# Patient Record
Sex: FEMALE | Race: WHITE | URBAN - METROPOLITAN AREA
[De-identification: names, ages, dates, MRNs, and addresses within clinical notes are randomized per-mention and may not be internally consistent; named-entity substitution may affect disease eponyms.]

---

## 2018-04-02 ENCOUNTER — OUTPATIENT (OUTPATIENT)
Dept: OUTPATIENT SERVICES | Facility: HOSPITAL | Age: 53
LOS: 1 days | Discharge: HOME | End: 2018-04-02

## 2018-04-03 DIAGNOSIS — G47.33 OBSTRUCTIVE SLEEP APNEA (ADULT) (PEDIATRIC): ICD-10-CM

## 2018-05-15 ENCOUNTER — OUTPATIENT (OUTPATIENT)
Dept: OUTPATIENT SERVICES | Facility: HOSPITAL | Age: 53
LOS: 1 days | Discharge: HOME | End: 2018-05-15

## 2018-05-16 DIAGNOSIS — G47.33 OBSTRUCTIVE SLEEP APNEA (ADULT) (PEDIATRIC): ICD-10-CM

## 2018-07-11 ENCOUNTER — OUTPATIENT (OUTPATIENT)
Dept: OUTPATIENT SERVICES | Facility: HOSPITAL | Age: 53
LOS: 1 days | Discharge: HOME | End: 2018-07-11

## 2018-07-11 DIAGNOSIS — Z12.31 ENCOUNTER FOR SCREENING MAMMOGRAM FOR MALIGNANT NEOPLASM OF BREAST: ICD-10-CM

## 2018-08-02 ENCOUNTER — APPOINTMENT (OUTPATIENT)
Dept: OTOLARYNGOLOGY | Facility: CLINIC | Age: 53
End: 2018-08-02
Payer: COMMERCIAL

## 2018-08-02 DIAGNOSIS — J32.9 CHRONIC SINUSITIS, UNSPECIFIED: ICD-10-CM

## 2018-08-02 DIAGNOSIS — M79.7 FIBROMYALGIA: ICD-10-CM

## 2018-08-02 DIAGNOSIS — H81.09 MENIERE'S DISEASE, UNSPECIFIED EAR: ICD-10-CM

## 2018-08-02 DIAGNOSIS — Z80.3 FAMILY HISTORY OF MALIGNANT NEOPLASM OF BREAST: ICD-10-CM

## 2018-08-02 DIAGNOSIS — G47.30 SLEEP APNEA, UNSPECIFIED: ICD-10-CM

## 2018-08-02 PROCEDURE — 31575 DIAGNOSTIC LARYNGOSCOPY: CPT

## 2018-08-02 PROCEDURE — 99204 OFFICE O/P NEW MOD 45 MIN: CPT | Mod: 25

## 2018-08-02 RX ORDER — FEXOFENADINE HYDROCHLORIDE 180 MG/1
180 TABLET, FILM COATED ORAL
Refills: 0 | Status: ACTIVE | COMMUNITY

## 2018-08-02 RX ORDER — DESVENLAFAXINE SUCCINATE 25 MG/1
25 TABLET, EXTENDED RELEASE ORAL
Refills: 0 | Status: ACTIVE | COMMUNITY

## 2018-08-02 RX ORDER — DULOXETINE HYDROCHLORIDE 30 MG/1
30 CAPSULE, DELAYED RELEASE ORAL
Refills: 0 | Status: ACTIVE | COMMUNITY

## 2018-08-02 RX ORDER — CELECOXIB 100 MG/1
100 CAPSULE ORAL
Refills: 0 | Status: ACTIVE | COMMUNITY

## 2018-08-03 ENCOUNTER — TRANSCRIPTION ENCOUNTER (OUTPATIENT)
Age: 53
End: 2018-08-03

## 2018-08-07 ENCOUNTER — OTHER (OUTPATIENT)
Age: 53
End: 2018-08-07

## 2018-09-13 ENCOUNTER — APPOINTMENT (OUTPATIENT)
Dept: OTOLARYNGOLOGY | Facility: CLINIC | Age: 53
End: 2018-09-13
Payer: COMMERCIAL

## 2018-09-13 DIAGNOSIS — Z78.9 OTHER SPECIFIED HEALTH STATUS: ICD-10-CM

## 2018-09-13 PROCEDURE — 99214 OFFICE O/P EST MOD 30 MIN: CPT

## 2018-09-13 RX ORDER — MONTELUKAST 10 MG/1
10 TABLET, FILM COATED ORAL
Qty: 90 | Refills: 3 | Status: ACTIVE | COMMUNITY
Start: 2018-08-02 | End: 1900-01-01

## 2018-11-08 ENCOUNTER — APPOINTMENT (OUTPATIENT)
Dept: OTOLARYNGOLOGY | Facility: CLINIC | Age: 53
End: 2018-11-08

## 2018-11-20 ENCOUNTER — APPOINTMENT (OUTPATIENT)
Dept: OTOLARYNGOLOGY | Facility: CLINIC | Age: 53
End: 2018-11-20
Payer: COMMERCIAL

## 2018-11-20 DIAGNOSIS — J30.2 OTHER SEASONAL ALLERGIC RHINITIS: ICD-10-CM

## 2018-11-20 DIAGNOSIS — Z87.898 PERSONAL HISTORY OF OTHER SPECIFIED CONDITIONS: ICD-10-CM

## 2018-11-20 PROCEDURE — 99214 OFFICE O/P EST MOD 30 MIN: CPT

## 2018-11-20 RX ORDER — FLUTICASONE PROPIONATE 50 UG/1
50 SPRAY, METERED NASAL DAILY
Qty: 1 | Refills: 2 | Status: ACTIVE | COMMUNITY
Start: 2018-11-20 | End: 1900-01-01

## 2019-02-05 ENCOUNTER — OUTPATIENT (OUTPATIENT)
Dept: OUTPATIENT SERVICES | Facility: HOSPITAL | Age: 54
LOS: 1 days | Discharge: HOME | End: 2019-02-05

## 2019-02-05 VITALS
HEIGHT: 66 IN | SYSTOLIC BLOOD PRESSURE: 135 MMHG | HEART RATE: 72 BPM | TEMPERATURE: 97 F | WEIGHT: 162.92 LBS | OXYGEN SATURATION: 99 % | RESPIRATION RATE: 18 BRPM | DIASTOLIC BLOOD PRESSURE: 60 MMHG

## 2019-02-05 DIAGNOSIS — Z01.818 ENCOUNTER FOR OTHER PREPROCEDURAL EXAMINATION: ICD-10-CM

## 2019-02-05 DIAGNOSIS — J34.2 DEVIATED NASAL SEPTUM: ICD-10-CM

## 2019-02-05 DIAGNOSIS — Z98.890 OTHER SPECIFIED POSTPROCEDURAL STATES: Chronic | ICD-10-CM

## 2019-02-05 LAB
ALBUMIN SERPL ELPH-MCNC: 4.6 G/DL — SIGNIFICANT CHANGE UP (ref 3.5–5.2)
ALP SERPL-CCNC: 102 U/L — SIGNIFICANT CHANGE UP (ref 30–115)
ALT FLD-CCNC: 19 U/L — SIGNIFICANT CHANGE UP (ref 0–41)
ANION GAP SERPL CALC-SCNC: 13 MMOL/L — SIGNIFICANT CHANGE UP (ref 7–14)
APTT BLD: 33.6 SEC — SIGNIFICANT CHANGE UP (ref 27–39.2)
AST SERPL-CCNC: 20 U/L — SIGNIFICANT CHANGE UP (ref 0–41)
BASOPHILS # BLD AUTO: 0.03 K/UL — SIGNIFICANT CHANGE UP (ref 0–0.2)
BASOPHILS NFR BLD AUTO: 0.5 % — SIGNIFICANT CHANGE UP (ref 0–1)
BILIRUB SERPL-MCNC: 0.3 MG/DL — SIGNIFICANT CHANGE UP (ref 0.2–1.2)
BUN SERPL-MCNC: 16 MG/DL — SIGNIFICANT CHANGE UP (ref 10–20)
CALCIUM SERPL-MCNC: 9.8 MG/DL — SIGNIFICANT CHANGE UP (ref 8.5–10.1)
CHLORIDE SERPL-SCNC: 102 MMOL/L — SIGNIFICANT CHANGE UP (ref 98–110)
CO2 SERPL-SCNC: 31 MMOL/L — SIGNIFICANT CHANGE UP (ref 17–32)
CREAT SERPL-MCNC: 0.8 MG/DL — SIGNIFICANT CHANGE UP (ref 0.7–1.5)
EOSINOPHIL # BLD AUTO: 0.19 K/UL — SIGNIFICANT CHANGE UP (ref 0–0.7)
EOSINOPHIL NFR BLD AUTO: 3.1 % — SIGNIFICANT CHANGE UP (ref 0–8)
GLUCOSE SERPL-MCNC: 104 MG/DL — HIGH (ref 70–99)
HCT VFR BLD CALC: 37.1 % — SIGNIFICANT CHANGE UP (ref 37–47)
HGB BLD-MCNC: 12.2 G/DL — SIGNIFICANT CHANGE UP (ref 12–16)
IMM GRANULOCYTES NFR BLD AUTO: 0.3 % — SIGNIFICANT CHANGE UP (ref 0.1–0.3)
INR BLD: 0.9 RATIO — SIGNIFICANT CHANGE UP (ref 0.65–1.3)
LYMPHOCYTES # BLD AUTO: 1.77 K/UL — SIGNIFICANT CHANGE UP (ref 1.2–3.4)
LYMPHOCYTES # BLD AUTO: 28.7 % — SIGNIFICANT CHANGE UP (ref 20.5–51.1)
MCHC RBC-ENTMCNC: 29 PG — SIGNIFICANT CHANGE UP (ref 27–31)
MCHC RBC-ENTMCNC: 32.9 G/DL — SIGNIFICANT CHANGE UP (ref 32–37)
MCV RBC AUTO: 88.1 FL — SIGNIFICANT CHANGE UP (ref 81–99)
MONOCYTES # BLD AUTO: 0.39 K/UL — SIGNIFICANT CHANGE UP (ref 0.1–0.6)
MONOCYTES NFR BLD AUTO: 6.3 % — SIGNIFICANT CHANGE UP (ref 1.7–9.3)
NEUTROPHILS # BLD AUTO: 3.76 K/UL — SIGNIFICANT CHANGE UP (ref 1.4–6.5)
NEUTROPHILS NFR BLD AUTO: 61.1 % — SIGNIFICANT CHANGE UP (ref 42.2–75.2)
NRBC # BLD: 0 /100 WBCS — SIGNIFICANT CHANGE UP (ref 0–0)
PLATELET # BLD AUTO: 230 K/UL — SIGNIFICANT CHANGE UP (ref 130–400)
POTASSIUM SERPL-MCNC: 4.2 MMOL/L — SIGNIFICANT CHANGE UP (ref 3.5–5)
POTASSIUM SERPL-SCNC: 4.2 MMOL/L — SIGNIFICANT CHANGE UP (ref 3.5–5)
PROT SERPL-MCNC: 7.3 G/DL — SIGNIFICANT CHANGE UP (ref 6–8)
PROTHROM AB SERPL-ACNC: 10.4 SEC — SIGNIFICANT CHANGE UP (ref 9.95–12.87)
RBC # BLD: 4.21 M/UL — SIGNIFICANT CHANGE UP (ref 4.2–5.4)
RBC # FLD: 13.6 % — SIGNIFICANT CHANGE UP (ref 11.5–14.5)
SODIUM SERPL-SCNC: 146 MMOL/L — SIGNIFICANT CHANGE UP (ref 135–146)
WBC # BLD: 6.16 K/UL — SIGNIFICANT CHANGE UP (ref 4.8–10.8)
WBC # FLD AUTO: 6.16 K/UL — SIGNIFICANT CHANGE UP (ref 4.8–10.8)

## 2019-02-05 NOTE — H&P PST ADULT - PMH
Asthma    Depression    Fibromyalgia    Migraine without status migrainosus, not intractable, unspecified migraine type

## 2019-02-05 NOTE — H&P PST ADULT - REASON FOR ADMISSION
WHERE IS YOUR PAIN NOW?  Jessica the areas on your body where you feel the described sensations.  Use the appropriate symbol.  Jessica the areas of radiation.  Include all affected areas.  Just to complete the picture, please draw in the face.     ACHE:  ^ ^ ^   NUMBNESS:  0000   PINS & NEEDLES:  = = = =                              ^ ^ ^                       0000              = = = =                                    ^ ^ ^                       0000            = = = =      BURNING:  XXXX   STABBING: ////                  XXXX                ////                         XXXX          ////     Please jessica the line below indicating your degree of pain right now  with 0 being no pain 10 being the worst pain possible.                                         0             1             2              3             4              5              6              7             8             9             10         Patient Signature:            
septoplasty bilateral inferior turbinate reduction possible excision nasal tip mass

## 2019-02-15 ENCOUNTER — OUTPATIENT (OUTPATIENT)
Dept: OUTPATIENT SERVICES | Facility: HOSPITAL | Age: 54
LOS: 1 days | Discharge: HOME | End: 2019-02-15

## 2019-02-15 ENCOUNTER — APPOINTMENT (OUTPATIENT)
Dept: OTOLARYNGOLOGY | Facility: HOSPITAL | Age: 54
End: 2019-02-15
Payer: COMMERCIAL

## 2019-02-15 ENCOUNTER — RESULT REVIEW (OUTPATIENT)
Age: 54
End: 2019-02-15

## 2019-02-15 VITALS
HEART RATE: 62 BPM | RESPIRATION RATE: 20 BRPM | WEIGHT: 162.92 LBS | SYSTOLIC BLOOD PRESSURE: 121 MMHG | HEIGHT: 66 IN | DIASTOLIC BLOOD PRESSURE: 66 MMHG

## 2019-02-15 VITALS — DIASTOLIC BLOOD PRESSURE: 71 MMHG | RESPIRATION RATE: 18 BRPM | SYSTOLIC BLOOD PRESSURE: 140 MMHG | HEART RATE: 65 BPM

## 2019-02-15 DIAGNOSIS — Z98.890 OTHER SPECIFIED POSTPROCEDURAL STATES: Chronic | ICD-10-CM

## 2019-02-15 PROCEDURE — 30140 RESECT INFERIOR TURBINATE: CPT | Mod: 50

## 2019-02-15 PROCEDURE — 30520 REPAIR OF NASAL SEPTUM: CPT

## 2019-02-15 RX ORDER — HYDROMORPHONE HYDROCHLORIDE 2 MG/ML
0.5 INJECTION INTRAMUSCULAR; INTRAVENOUS; SUBCUTANEOUS
Qty: 0 | Refills: 0 | Status: DISCONTINUED | OUTPATIENT
Start: 2019-02-15 | End: 2019-02-16

## 2019-02-15 RX ORDER — MEPERIDINE HYDROCHLORIDE 50 MG/ML
12.5 INJECTION INTRAMUSCULAR; INTRAVENOUS; SUBCUTANEOUS
Qty: 0 | Refills: 0 | Status: DISCONTINUED | OUTPATIENT
Start: 2019-02-15 | End: 2019-02-16

## 2019-02-15 RX ORDER — SODIUM CHLORIDE 9 MG/ML
1000 INJECTION, SOLUTION INTRAVENOUS
Qty: 0 | Refills: 0 | Status: DISCONTINUED | OUTPATIENT
Start: 2019-02-15 | End: 2019-02-16

## 2019-02-15 RX ORDER — HYDROMORPHONE HYDROCHLORIDE 2 MG/ML
1 INJECTION INTRAMUSCULAR; INTRAVENOUS; SUBCUTANEOUS
Qty: 0 | Refills: 0 | Status: DISCONTINUED | OUTPATIENT
Start: 2019-02-15 | End: 2019-02-16

## 2019-02-15 RX ORDER — ONDANSETRON 8 MG/1
4 TABLET, FILM COATED ORAL ONCE
Qty: 0 | Refills: 0 | Status: DISCONTINUED | OUTPATIENT
Start: 2019-02-15 | End: 2019-02-16

## 2019-02-15 RX ADMIN — HYDROMORPHONE HYDROCHLORIDE 0.5 MILLIGRAM(S): 2 INJECTION INTRAMUSCULAR; INTRAVENOUS; SUBCUTANEOUS at 11:59

## 2019-02-15 RX ADMIN — HYDROMORPHONE HYDROCHLORIDE 0.5 MILLIGRAM(S): 2 INJECTION INTRAMUSCULAR; INTRAVENOUS; SUBCUTANEOUS at 12:35

## 2019-02-15 RX ADMIN — HYDROMORPHONE HYDROCHLORIDE 0.5 MILLIGRAM(S): 2 INJECTION INTRAMUSCULAR; INTRAVENOUS; SUBCUTANEOUS at 12:43

## 2019-02-15 RX ADMIN — SODIUM CHLORIDE 125 MILLILITER(S): 9 INJECTION, SOLUTION INTRAVENOUS at 11:47

## 2019-02-15 RX ADMIN — HYDROMORPHONE HYDROCHLORIDE 0.5 MILLIGRAM(S): 2 INJECTION INTRAMUSCULAR; INTRAVENOUS; SUBCUTANEOUS at 12:44

## 2019-02-15 NOTE — ASU DISCHARGE PLAN (ADULT/PEDIATRIC). - PAIN
Can take ibuprofen and/or tylenol for pain. Do not exceed 3500mg tylenol/day/prescription called to pharmacy

## 2019-02-15 NOTE — CHART NOTE - NSCHARTNOTEFT_GEN_A_CORE
PACU ANESTHESIA PACU ADMISSION NOTE      Procedure:Septoplasty with reduction of turbinates on both sides of nose    Post op diagnosisNasal septal deviation      ____ Intubated  TV:______       Rate: ______      FiO2: ______    ___x_ Patent Airway    ___x_ Full return of protective reflexes    ____ Full recovery from anesthesia / sedation to baseline status    Viitals:  see anesthesia record          Mental Status:  ___x_ Awake   _____ Alert   _____ Drowsy   _____ Sedated    Nausea/Vomiting: ____ Yes, See Post - Op Orders      __x__ No    Pain Scale (0-10): _____    Treatment: ____ None    __x__ See Post - Op/PCA Orders    Post - Operative Fluids:   ____ Oral   __x__ See Post - Op Orders    Plan:         Discharge:   __x__Home       _____Floor         _____Critical Care    _____Other:_________________    Comments:  uneventful perioperative course; VSS on admission to PACU; endorsed to RN

## 2019-02-15 NOTE — BRIEF OPERATIVE NOTE - PROCEDURE
<<-----Click on this checkbox to enter Procedure Septoplasty with reduction of turbinates on both sides of nose  02/15/2019    Active  [id number]

## 2019-02-15 NOTE — PRE-ANESTHESIA EVALUATION ADULT - NSANTHADDINFOFT_GEN_ALL_CORE
Discussed risks and benefits of anesthesia including but not limited to the risk of sore throat, N/V, damage to mouth, teeth and lips, stroke, MI and even death.  Patient demonstrates understanding, all questions answered. The patient wishes to proceed with planned treatment.

## 2019-02-18 LAB — SURGICAL PATHOLOGY STUDY: SIGNIFICANT CHANGE UP

## 2019-02-19 PROBLEM — F32.9 MAJOR DEPRESSIVE DISORDER, SINGLE EPISODE, UNSPECIFIED: Chronic | Status: ACTIVE | Noted: 2019-02-05

## 2019-02-19 PROBLEM — J45.909 UNSPECIFIED ASTHMA, UNCOMPLICATED: Chronic | Status: ACTIVE | Noted: 2019-02-05

## 2019-02-19 PROBLEM — G43.909 MIGRAINE, UNSPECIFIED, NOT INTRACTABLE, WITHOUT STATUS MIGRAINOSUS: Chronic | Status: ACTIVE | Noted: 2019-02-05

## 2019-02-19 PROBLEM — M79.7 FIBROMYALGIA: Chronic | Status: ACTIVE | Noted: 2019-02-05

## 2019-02-20 ENCOUNTER — APPOINTMENT (OUTPATIENT)
Dept: OTOLARYNGOLOGY | Facility: CLINIC | Age: 54
End: 2019-02-20
Payer: COMMERCIAL

## 2019-02-20 VITALS — SYSTOLIC BLOOD PRESSURE: 122 MMHG | HEIGHT: 67 IN | DIASTOLIC BLOOD PRESSURE: 77 MMHG

## 2019-02-20 DIAGNOSIS — J34.3 HYPERTROPHY OF NASAL TURBINATES: ICD-10-CM

## 2019-02-20 DIAGNOSIS — J34.2 DEVIATED NASAL SEPTUM: ICD-10-CM

## 2019-02-20 DIAGNOSIS — Z88.2 ALLERGY STATUS TO SULFONAMIDES: ICD-10-CM

## 2019-02-20 PROCEDURE — 99024 POSTOP FOLLOW-UP VISIT: CPT

## 2019-02-20 RX ORDER — TINIDAZOLE 500 MG/1
500 TABLET, FILM COATED ORAL
Qty: 20 | Refills: 0 | Status: ACTIVE | COMMUNITY
Start: 2018-10-17

## 2019-02-20 RX ORDER — METRONIDAZOLE 500 MG/1
500 TABLET ORAL
Qty: 14 | Refills: 0 | Status: ACTIVE | COMMUNITY
Start: 2018-10-04

## 2019-02-20 RX ORDER — CIPROFLOXACIN HYDROCHLORIDE 500 MG/1
500 TABLET, FILM COATED ORAL
Qty: 14 | Refills: 0 | Status: ACTIVE | COMMUNITY
Start: 2018-11-17

## 2019-02-20 RX ORDER — OXYCODONE AND ACETAMINOPHEN 5; 325 MG/1; MG/1
5-325 TABLET ORAL
Qty: 40 | Refills: 0 | Status: ACTIVE | COMMUNITY
Start: 2019-02-16

## 2019-02-20 RX ORDER — SUMATRIPTAN 100 MG/1
100 TABLET, FILM COATED ORAL
Qty: 9 | Refills: 0 | Status: ACTIVE | COMMUNITY
Start: 2018-11-30

## 2019-02-20 RX ORDER — DULOXETINE HYDROCHLORIDE 60 MG/1
60 CAPSULE, DELAYED RELEASE PELLETS ORAL
Qty: 90 | Refills: 0 | Status: ACTIVE | COMMUNITY
Start: 2018-11-30

## 2019-02-20 NOTE — PHYSICAL EXAM
[Normal] : external appearance is normal [de-identified] : nasal septal stents removed. Nasal cavity suctioned of clot and secretions. Patent bilateral nasal cavities - visualized with 0 degree nasal endoscope

## 2019-02-20 NOTE — ASSESSMENT
[FreeTextEntry1] : - healing well, subjective improvement in nasal breathing after nasal stent removal\par - continue nasal saline spray\par - f/up in 1 month

## 2019-02-20 NOTE — HISTORY OF PRESENT ILLNESS
[de-identified] : 53 Year old patient is present today with chronic sinus problems for ~20 years. She had severe seasonal allergies and asthma. Sinus pressure and problems started in 1994. Facial pain and pressure over forehead and cheekbones x 8 years. She has used Flonase in the past, with some benefit at night. She denies current rhinorrhea however in the winter she has post-nasal drainage. She has periods of time where her symptoms are moderate, notably in the summer, other times of the year, symptoms more severe in remaining parts of the year. She reports nasal congestion on the left side worse than right. Nasal congestion is mild. She was allergy tested, 21 of 40 skin tests were positive.  She did not start allergy testing. She was taking Singulair and Allegra, no more refills on Singulair so only on Allegra. She uses CPAP for AZAEL, AHI 7 in March 2018.\par \par Patient has nasal congestion mostly during the night. Patient does take allegra during the day, and patient states it seem to help her during the day. She does have a deviated septum. Dr Montana talked to her about balloon procedures.\par \par  9/13/18: Patient following up on sinusitis. Patient had CT performed. Brings CD of sinuses and allergy test results from 1 year ago. Has not made f/up appointment with allergist. Did not have Singulair refilled yet due to pharmacy issues. \par \par 11/20/18 patient is following up for  seasonal allergic rhinitis. She started taking Singulair and has some improvement. She still c/o of nasal dryness, worse at night. Is using humidifier. Is using saline gel, without much benefit.  She has seen an allergist and has begun allergy testing, has another visit set up.  Tinnitus in right ear for 2-3 days. Unsure if hearing loss. She has a UTI, started on cipro last week. Is not currently using her CPAP.  [FreeTextEntry1] : \par 2/20/19 Patient is s/p septoplasty 2/10/19. Overall doing well. Using saline spray.

## 2019-02-22 ENCOUNTER — APPOINTMENT (OUTPATIENT)
Dept: OTOLARYNGOLOGY | Facility: CLINIC | Age: 54
End: 2019-02-22

## 2019-03-05 ENCOUNTER — TRANSCRIPTION ENCOUNTER (OUTPATIENT)
Age: 54
End: 2019-03-05

## 2019-03-27 ENCOUNTER — APPOINTMENT (OUTPATIENT)
Dept: OTOLARYNGOLOGY | Facility: CLINIC | Age: 54
End: 2019-03-27

## 2019-05-17 ENCOUNTER — EMERGENCY (EMERGENCY)
Facility: HOSPITAL | Age: 54
LOS: 0 days | Discharge: HOME | End: 2019-05-17
Attending: EMERGENCY MEDICINE | Admitting: EMERGENCY MEDICINE
Payer: COMMERCIAL

## 2019-05-17 VITALS
SYSTOLIC BLOOD PRESSURE: 148 MMHG | DIASTOLIC BLOOD PRESSURE: 72 MMHG | RESPIRATION RATE: 16 BRPM | HEART RATE: 62 BPM | OXYGEN SATURATION: 100 % | TEMPERATURE: 96 F

## 2019-05-17 VITALS
OXYGEN SATURATION: 100 % | HEART RATE: 63 BPM | RESPIRATION RATE: 18 BRPM | TEMPERATURE: 97 F | WEIGHT: 171.08 LBS | SYSTOLIC BLOOD PRESSURE: 160 MMHG | DIASTOLIC BLOOD PRESSURE: 80 MMHG

## 2019-05-17 DIAGNOSIS — R10.9 UNSPECIFIED ABDOMINAL PAIN: ICD-10-CM

## 2019-05-17 DIAGNOSIS — R10.31 RIGHT LOWER QUADRANT PAIN: ICD-10-CM

## 2019-05-17 DIAGNOSIS — Z79.899 OTHER LONG TERM (CURRENT) DRUG THERAPY: ICD-10-CM

## 2019-05-17 DIAGNOSIS — Z88.2 ALLERGY STATUS TO SULFONAMIDES: ICD-10-CM

## 2019-05-17 DIAGNOSIS — Z98.890 OTHER SPECIFIED POSTPROCEDURAL STATES: Chronic | ICD-10-CM

## 2019-05-17 DIAGNOSIS — R19.7 DIARRHEA, UNSPECIFIED: ICD-10-CM

## 2019-05-17 DIAGNOSIS — E86.0 DEHYDRATION: ICD-10-CM

## 2019-05-17 DIAGNOSIS — Z86.69 PERSONAL HISTORY OF OTHER DISEASES OF THE NERVOUS SYSTEM AND SENSE ORGANS: ICD-10-CM

## 2019-05-17 DIAGNOSIS — F32.9 MAJOR DEPRESSIVE DISORDER, SINGLE EPISODE, UNSPECIFIED: ICD-10-CM

## 2019-05-17 LAB
ALBUMIN SERPL ELPH-MCNC: 4 G/DL — SIGNIFICANT CHANGE UP (ref 3.5–5.2)
ALP SERPL-CCNC: 132 U/L — HIGH (ref 30–115)
ALT FLD-CCNC: 12 U/L — SIGNIFICANT CHANGE UP (ref 0–41)
ANION GAP SERPL CALC-SCNC: 10 MMOL/L — SIGNIFICANT CHANGE UP (ref 7–14)
APPEARANCE UR: CLEAR — SIGNIFICANT CHANGE UP
AST SERPL-CCNC: 14 U/L — SIGNIFICANT CHANGE UP (ref 0–41)
BACTERIA # UR AUTO: ABNORMAL
BASOPHILS # BLD AUTO: 0.08 K/UL — SIGNIFICANT CHANGE UP (ref 0–0.2)
BASOPHILS NFR BLD AUTO: 0.7 % — SIGNIFICANT CHANGE UP (ref 0–1)
BILIRUB SERPL-MCNC: 0.2 MG/DL — SIGNIFICANT CHANGE UP (ref 0.2–1.2)
BILIRUB UR-MCNC: NEGATIVE — SIGNIFICANT CHANGE UP
BUN SERPL-MCNC: 43 MG/DL — HIGH (ref 10–20)
CALCIUM SERPL-MCNC: 9.3 MG/DL — SIGNIFICANT CHANGE UP (ref 8.5–10.1)
CHLORIDE SERPL-SCNC: 99 MMOL/L — SIGNIFICANT CHANGE UP (ref 98–110)
CO2 SERPL-SCNC: 27 MMOL/L — SIGNIFICANT CHANGE UP (ref 17–32)
COLOR SPEC: YELLOW — SIGNIFICANT CHANGE UP
CREAT SERPL-MCNC: 1.8 MG/DL — HIGH (ref 0.7–1.5)
DIFF PNL FLD: NEGATIVE — SIGNIFICANT CHANGE UP
EOSINOPHIL # BLD AUTO: 0.04 K/UL — SIGNIFICANT CHANGE UP (ref 0–0.7)
EOSINOPHIL NFR BLD AUTO: 0.3 % — SIGNIFICANT CHANGE UP (ref 0–8)
EPI CELLS # UR: ABNORMAL /HPF
GLUCOSE SERPL-MCNC: 152 MG/DL — HIGH (ref 70–99)
GLUCOSE UR QL: NEGATIVE MG/DL — SIGNIFICANT CHANGE UP
HCT VFR BLD CALC: 39.1 % — SIGNIFICANT CHANGE UP (ref 37–47)
HGB BLD-MCNC: 12.7 G/DL — SIGNIFICANT CHANGE UP (ref 12–16)
IMM GRANULOCYTES NFR BLD AUTO: 0.3 % — SIGNIFICANT CHANGE UP (ref 0.1–0.3)
KETONES UR-MCNC: NEGATIVE — SIGNIFICANT CHANGE UP
LEUKOCYTE ESTERASE UR-ACNC: ABNORMAL
LIDOCAIN IGE QN: 27 U/L — SIGNIFICANT CHANGE UP (ref 7–60)
LYMPHOCYTES # BLD AUTO: 1.24 K/UL — SIGNIFICANT CHANGE UP (ref 1.2–3.4)
LYMPHOCYTES # BLD AUTO: 10.5 % — LOW (ref 20.5–51.1)
MCHC RBC-ENTMCNC: 30 PG — SIGNIFICANT CHANGE UP (ref 27–31)
MCHC RBC-ENTMCNC: 32.5 G/DL — SIGNIFICANT CHANGE UP (ref 32–37)
MCV RBC AUTO: 92.2 FL — SIGNIFICANT CHANGE UP (ref 81–99)
MONOCYTES # BLD AUTO: 0.45 K/UL — SIGNIFICANT CHANGE UP (ref 0.1–0.6)
MONOCYTES NFR BLD AUTO: 3.8 % — SIGNIFICANT CHANGE UP (ref 1.7–9.3)
NEUTROPHILS # BLD AUTO: 9.97 K/UL — HIGH (ref 1.4–6.5)
NEUTROPHILS NFR BLD AUTO: 84.4 % — HIGH (ref 42.2–75.2)
NITRITE UR-MCNC: NEGATIVE — SIGNIFICANT CHANGE UP
NRBC # BLD: 0 /100 WBCS — SIGNIFICANT CHANGE UP (ref 0–0)
PH UR: 6 — SIGNIFICANT CHANGE UP (ref 5–8)
PLATELET # BLD AUTO: 307 K/UL — SIGNIFICANT CHANGE UP (ref 130–400)
POTASSIUM SERPL-MCNC: 4.7 MMOL/L — SIGNIFICANT CHANGE UP (ref 3.5–5)
POTASSIUM SERPL-SCNC: 4.7 MMOL/L — SIGNIFICANT CHANGE UP (ref 3.5–5)
PROT SERPL-MCNC: 7.2 G/DL — SIGNIFICANT CHANGE UP (ref 6–8)
PROT UR-MCNC: NEGATIVE MG/DL — SIGNIFICANT CHANGE UP
RBC # BLD: 4.24 M/UL — SIGNIFICANT CHANGE UP (ref 4.2–5.4)
RBC # FLD: 13.9 % — SIGNIFICANT CHANGE UP (ref 11.5–14.5)
RBC CASTS # UR COMP ASSIST: SIGNIFICANT CHANGE UP /HPF
SODIUM SERPL-SCNC: 136 MMOL/L — SIGNIFICANT CHANGE UP (ref 135–146)
SP GR SPEC: <=1.005 — SIGNIFICANT CHANGE UP (ref 1.01–1.03)
UROBILINOGEN FLD QL: 0.2 MG/DL — SIGNIFICANT CHANGE UP (ref 0.2–0.2)
WBC # BLD: 11.82 K/UL — HIGH (ref 4.8–10.8)
WBC # FLD AUTO: 11.82 K/UL — HIGH (ref 4.8–10.8)
WBC UR QL: SIGNIFICANT CHANGE UP /HPF

## 2019-05-17 PROCEDURE — 74177 CT ABD & PELVIS W/CONTRAST: CPT | Mod: 26

## 2019-05-17 PROCEDURE — 99284 EMERGENCY DEPT VISIT MOD MDM: CPT

## 2019-05-17 RX ORDER — SODIUM CHLORIDE 9 MG/ML
1000 INJECTION INTRAMUSCULAR; INTRAVENOUS; SUBCUTANEOUS ONCE
Refills: 0 | Status: COMPLETED | OUTPATIENT
Start: 2019-05-17 | End: 2019-05-17

## 2019-05-17 RX ORDER — SODIUM CHLORIDE 9 MG/ML
1000 INJECTION INTRAMUSCULAR; INTRAVENOUS; SUBCUTANEOUS
Refills: 0 | Status: DISCONTINUED | OUTPATIENT
Start: 2019-05-17 | End: 2019-05-17

## 2019-05-17 RX ADMIN — SODIUM CHLORIDE 125 MILLILITER(S): 9 INJECTION INTRAMUSCULAR; INTRAVENOUS; SUBCUTANEOUS at 19:49

## 2019-05-17 RX ADMIN — SODIUM CHLORIDE 2000 MILLILITER(S): 9 INJECTION INTRAMUSCULAR; INTRAVENOUS; SUBCUTANEOUS at 19:34

## 2019-05-17 NOTE — ED PROVIDER NOTE - PROVIDER TOKENS
FREE:[LAST:[your PMD 2 days],PHONE:[(   )    -],FAX:[(   )    -]],PROVIDER:[TOKEN:[[id number]],FOLLOWUP:[1-3 Days]]

## 2019-05-17 NOTE — ED PROVIDER NOTE - CARE PROVIDERS DIRECT ADDRESSES
,DirectAddress_Unknown,[email or web address]

## 2019-05-17 NOTE — ED PROVIDER NOTE - CLINICAL SUMMARY MEDICAL DECISION MAKING FREE TEXT BOX
Results reviewed and d/w patient, copies given to her.  Rec cont Oral hydration, f/u with PMD and GI. Pt instructed to return if any worsening symptoms or concerns.  They verbalize understanding.

## 2019-05-17 NOTE — ED ADULT NURSE NOTE - PSH
H/O knee surgery  3/2019  H/O sinus surgery  2/15/2019  History of surgery  tubal ligation  uterine ablation  pelvic sling

## 2019-05-17 NOTE — ED PROVIDER NOTE - NSFOLLOWUPINSTRUCTIONS_ED_ALL_ED_FT
Abdominal Pain    Many things can cause abdominal pain. Many times, abdominal pain is not caused by a disease and will improve without treatment. Your health care provider will do a physical exam to determine if there is a dangerous cause of your pain; blood tests and imaging may help determine the cause of your pain. However, in many cases, no cause may be found and you may need further testing as an outpatient. Monitor your abdominal pain for any changes.     SEEK IMMEDIATE MEDICAL CARE IF YOU HAVE ANY OF THE FOLLOWING SYMPTOMS: worsening abdominal pain, uncontrollable vomiting, profuse diarrhea, inability to have bowel movements or pass gas, black or bloody stools, fever accompanying chest pain or back pain, or fainting. These symptoms may represent a serious problem that is an emergency. Do not wait to see if the symptoms will go away. Get medical help right away. Call 911 and do not drive yourself to the hospital.    Diarrhea    Diarrhea is frequent loose and watery bowel movements that has many causes. Diarrhea can make you feel weak and cause you to become dehydrated. Diarrhea typically lasts 3-5 days. However, it can last longer if it is a sign of something more serious. Drink clear fluids to prevent dehydration. Eat bland, easy-to-digest foods as tolerated.     SEEK IMMEDIATE MEDICAL CARE IF YOU HAVE THE FOLLOWING SYMPTOMS: fever, lightheadedness/dizziness, chest pain, black or bloody stools, shortness of breath, severe abdominal or back pain, or any signs of dehydration.    Dehydration, Adult    Dehydration is a condition in which there is not enough fluid or water in the body. This happens when you lose more fluids than you take in. Important organs, such as the kidneys, brain, and heart, cannot function without a proper amount of fluids. Any loss of fluids from the body can lead to dehydration.    Dehydration can range from mild to severe. This condition should be treated right away to prevent it from becoming severe.    What are the causes?  This condition may be caused by:    Vomiting.  Diarrhea.  Excessive sweating, such as from heat exposure or exercise.  Not drinking enough fluid, especially:    When ill.  While doing activity that requires a lot of energy.    Excessive urination.  Fever.  Infection.  Certain medicines, such as medicines that cause the body to lose excess fluid (diuretics).  Inability to access safe drinking water.  Reduced physical ability to get adequate water and food.    What increases the risk?  This condition is more likely to develop in people:    Who have a poorly controlled long-term (chronic) illness, such as diabetes, heart disease, or kidney disease.  Who are age 65 or older.  Who are disabled.  Who live in a place with high altitude.  Who play endurance sports.    What are the signs or symptoms?  Symptoms of mild dehydration may include:     Thirst.  Dry lips.  Slightly dry mouth.  Dry, warm skin.  Dizziness.  Symptoms of moderate dehydration may include:     Very dry mouth.  Muscle cramps.  Dark urine. Urine may be the color of tea.  Decreased urine production.  Decreased tear production.  Heartbeat that is irregular or faster than normal (palpitations).  Headache.  Light-headedness, especially when you stand up from a sitting position.  Fainting (syncope).  Symptoms of severe dehydration may include:     Changes in skin, such as:    Cold and clammy skin.  Blotchy (mottled) or pale skin.  Skin that does not quickly return to normal after being lightly pinched and released (poor skin turgor).    Changes in body fluids, such as:    Extreme thirst.  No tear production.  Inability to sweat when body temperature is high, such as in hot weather.  Very little urine production.    Changes in vital signs, such as:    Weak pulse.  Pulse that is more than 100 beats a minute when sitting still.  Rapid breathing.  Low blood pressure.    Other changes, such as:    Sunken eyes.  Cold hands and feet.  Confusion.  Lack of energy (lethargy).  Difficulty waking up from sleep.  Short-term weight loss.  Unconsciousness.    How is this diagnosed?  This condition is diagnosed based on your symptoms and a physical exam. Blood and urine tests may be done to help confirm the diagnosis.    How is this treated?  Treatment for this condition depends on the severity. Mild or moderate dehydration can often be treated at home. Treatment should be started right away. Do not wait until dehydration becomes severe. Severe dehydration is an emergency and it needs to be treated in a hospital.    Treatment for mild dehydration may include:     Drinking more fluids.  Replacing salts and minerals in your blood (electrolytes) that you may have lost.  Treatment for moderate dehydration may include:     Drinking an oral rehydration solution (ORS). This is a drink that helps you replace fluids and electrolytes (rehydrate). It can be found at pharmacies and retail stores.  Treatment for severe dehydration may include:     Receiving fluids through an IV tube.  Receiving an electrolyte solution through a feeding tube that is passed through your nose and into your stomach (nasogastric tube, or NG tube).  Correcting any abnormalities in electrolytes.  Treating the underlying cause of dehydration.  Follow these instructions at home:  If directed by your health care provider, drink an ORS:    Make an ORS by following instructions on the package.  Start by drinking small amounts, about ½ cup (120 mL) every 5–10 minutes.  Slowly increase how much you drink until you have taken the amount recommended by your health care provider.    Drink enough clear fluid to keep your urine clear or pale yellow. If you were told to drink an ORS, finish the ORS first, then start slowly drinking other clear fluids. Drink fluids such as:    Water. Do not drink only water. Doing that can lead to having too little salt (sodium) in the body (hyponatremia).  Ice chips.  Fruit juice that you have added water to (diluted fruit juice).  Low-calorie sports drinks.    Avoid:    Alcohol.  Drinks that contain a lot of sugar. These include high-calorie sports drinks, fruit juice that is not diluted, and soda.  Caffeine.  Foods that are greasy or contain a lot of fat or sugar.    Take over-the-counter and prescription medicines only as told by your health care provider.  Do not take sodium tablets. This can lead to having too much sodium in the body (hypernatremia).  Eat foods that contain a healthy balance of electrolytes, such as bananas, oranges, potatoes, tomatoes, and spinach.  Keep all follow-up visits as told by your health care provider. This is important.  Contact a health care provider if:  You have abdominal pain that:    Gets worse.  Stays in one area (localizes).    You have a rash.  You have a stiff neck.  You are more irritable than usual.  You are sleepier or more difficult to wake up than usual.  You feel weak or dizzy.  You feel very thirsty.  You have urinated only a small amount of very dark urine over 6–8 hours.  Get help right away if:  You have symptoms of severe dehydration.  You cannot drink fluids without vomiting.  Your symptoms get worse with treatment.  You have a fever.  You have a severe headache.  You have vomiting or diarrhea that:    Gets worse.  Does not go away.    You have blood or green matter (bile) in your vomit.  You have blood in your stool. This may cause stool to look black and tarry.  You have not urinated in 6–8 hours.  You faint.  Your heart rate while sitting still is over 100 beats a minute.  You have trouble breathing.  This information is not intended to replace advice given to you by your health care provider. Make sure you discuss any questions you have with your health care provider.    Document Released: 12/18/2006 Document Revised: 07/14/2017 Document Reviewed: 02/10/2017  [employer] Interactive Patient Education © 2019 [employer]
General

## 2019-05-17 NOTE — ED PROVIDER NOTE - CARE PLAN
Principal Discharge DX:	Abdominal pain  Secondary Diagnosis:	Diarrhea  Secondary Diagnosis:	Dehydration

## 2019-05-17 NOTE — ED ADULT NURSE NOTE - NSIMPLEMENTINTERV_GEN_ALL_ED
Implemented All Universal Safety Interventions:  [place] to call system. Call bell, personal items and telephone within reach. Instruct patient to call for assistance. Room bathroom lighting operational. Non-slip footwear when patient is off stretcher. Physically safe environment: no spills, clutter or unnecessary equipment. Stretcher in lowest position, wheels locked, appropriate side rails in place.

## 2019-05-17 NOTE — ED PROVIDER NOTE - CARE PROVIDER_API CALL
your PMD 2 days,   Phone: (   )    -  Fax: (   )    -  Follow Up Time:     Richard Rizvi)  Gastroenterology  [facility] [place] [address]  Phone: [phone]  Fax: [phone]  Follow Up Time: 1-3 Days

## 2019-05-17 NOTE — ED PROVIDER NOTE - NS ED ROS FT
Review of Systems    Constitutional: (-) fever, (-) chills  Eyes/ENT: (-) blurry vision, (-) epistaxis, (-) sore throat  Cardiovascular: (-) chest pain, (-) syncope  Respiratory: (-) cough, (-) shortness of breath  Gastrointestinal: (+) pain, (-) nausea, (-) vomiting, (+) diarrhea  Musculoskeletal: (-) neck pain, (-) back pain, (-) joint pain  Integumentary: (-) rash, (-) edema  Neurological: (-) headache, (-) altered mental status  Psychiatric: (-) hallucinations  Allergic/Immunologic: (-) pruritus

## 2019-05-17 NOTE — ED PROVIDER NOTE - OBJECTIVE STATEMENT
55 yo F c/o diarrhea x3 weeks and abdominal pains today. Pain is intermittent, dull, worse with touching abdomen. No f/c/n/v. No hematuria or dysuria. Sent in by PMD to r/o AP.

## 2019-05-17 NOTE — ED PROVIDER NOTE - PHYSICAL EXAMINATION
Gen: Alert, NAD, well appearing  Head: NC, AT, PERRL, EOMI, normal lids/conjunctiva  ENT: normal hearing, patent oropharynx without erythema/exudate  Neck: +supple, no tenderness/meningismus,  Pulm: Bilateral BS, normal resp effort, no wheeze/stridor/retractions  CV: RRR, no murmer  Abd: soft, tender to RLQ on palpation. BS +. No guarding or rebound. No CVA tenderness. No psoa/obturator  Mskel: no edema/erythema/cyanosis  Skin: no rash, warm/dry  Neuro: AAOx3, no sensory/motor deficits

## 2019-05-17 NOTE — ED PROVIDER NOTE - ATTENDING CONTRIBUTION TO CARE
53 yo F PMHx noted presents with c/o diarrhea x 3 weeks, now with abd pain.  Pt states that initially she did have fever, no n/v, no urinary complaints.  Pt seen by PMD office today and sent in for further evaluation.  Had colonoscopy 5 yrs ago, no recent travel, or abx use. On exam pt in NAD AAO  x3, Op clear, Lungs CAT B/L no wrr, abd soft nd, + BS, + tender lower abdomen, no rebound no guarding

## 2019-07-22 ENCOUNTER — OUTPATIENT (OUTPATIENT)
Dept: OUTPATIENT SERVICES | Facility: HOSPITAL | Age: 54
LOS: 1 days | Discharge: HOME | End: 2019-07-22
Payer: COMMERCIAL

## 2019-07-22 DIAGNOSIS — Z98.890 OTHER SPECIFIED POSTPROCEDURAL STATES: Chronic | ICD-10-CM

## 2019-07-22 DIAGNOSIS — Z12.31 ENCOUNTER FOR SCREENING MAMMOGRAM FOR MALIGNANT NEOPLASM OF BREAST: ICD-10-CM

## 2019-07-22 PROCEDURE — 77063 BREAST TOMOSYNTHESIS BI: CPT | Mod: 26

## 2019-07-22 PROCEDURE — 77067 SCR MAMMO BI INCL CAD: CPT | Mod: 26

## 2020-03-11 ENCOUNTER — OUTPATIENT (OUTPATIENT)
Dept: OUTPATIENT SERVICES | Facility: HOSPITAL | Age: 55
LOS: 1 days | Discharge: HOME | End: 2020-03-11
Payer: COMMERCIAL

## 2020-03-11 VITALS
SYSTOLIC BLOOD PRESSURE: 137 MMHG | RESPIRATION RATE: 16 BRPM | TEMPERATURE: 98 F | DIASTOLIC BLOOD PRESSURE: 82 MMHG | WEIGHT: 158.95 LBS | HEIGHT: 66 IN | HEART RATE: 71 BPM | OXYGEN SATURATION: 99 %

## 2020-03-11 DIAGNOSIS — Z98.890 OTHER SPECIFIED POSTPROCEDURAL STATES: Chronic | ICD-10-CM

## 2020-03-11 DIAGNOSIS — Z01.818 ENCOUNTER FOR OTHER PREPROCEDURAL EXAMINATION: ICD-10-CM

## 2020-03-11 DIAGNOSIS — K80.20 CALCULUS OF GALLBLADDER WITHOUT CHOLECYSTITIS WITHOUT OBSTRUCTION: ICD-10-CM

## 2020-03-11 LAB
ALBUMIN SERPL ELPH-MCNC: 4.7 G/DL — SIGNIFICANT CHANGE UP (ref 3.5–5.2)
ALP SERPL-CCNC: 93 U/L — SIGNIFICANT CHANGE UP (ref 30–115)
ALT FLD-CCNC: 37 U/L — SIGNIFICANT CHANGE UP (ref 0–41)
ANION GAP SERPL CALC-SCNC: 10 MMOL/L — SIGNIFICANT CHANGE UP (ref 7–14)
APTT BLD: 34.3 SEC — SIGNIFICANT CHANGE UP (ref 27–39.2)
AST SERPL-CCNC: 33 U/L — SIGNIFICANT CHANGE UP (ref 0–41)
BASOPHILS # BLD AUTO: 0.03 K/UL — SIGNIFICANT CHANGE UP (ref 0–0.2)
BASOPHILS NFR BLD AUTO: 0.7 % — SIGNIFICANT CHANGE UP (ref 0–1)
BILIRUB SERPL-MCNC: 0.3 MG/DL — SIGNIFICANT CHANGE UP (ref 0.2–1.2)
BUN SERPL-MCNC: 21 MG/DL — HIGH (ref 10–20)
CALCIUM SERPL-MCNC: 9.5 MG/DL — SIGNIFICANT CHANGE UP (ref 8.5–10.1)
CHLORIDE SERPL-SCNC: 100 MMOL/L — SIGNIFICANT CHANGE UP (ref 98–110)
CO2 SERPL-SCNC: 30 MMOL/L — SIGNIFICANT CHANGE UP (ref 17–32)
CREAT SERPL-MCNC: 0.9 MG/DL — SIGNIFICANT CHANGE UP (ref 0.7–1.5)
EOSINOPHIL # BLD AUTO: 0.15 K/UL — SIGNIFICANT CHANGE UP (ref 0–0.7)
EOSINOPHIL NFR BLD AUTO: 3.6 % — SIGNIFICANT CHANGE UP (ref 0–8)
GLUCOSE SERPL-MCNC: 93 MG/DL — SIGNIFICANT CHANGE UP (ref 70–99)
HCT VFR BLD CALC: 37.4 % — SIGNIFICANT CHANGE UP (ref 37–47)
HGB BLD-MCNC: 12.1 G/DL — SIGNIFICANT CHANGE UP (ref 12–16)
IMM GRANULOCYTES NFR BLD AUTO: 0.2 % — SIGNIFICANT CHANGE UP (ref 0.1–0.3)
INR BLD: 0.92 RATIO — SIGNIFICANT CHANGE UP (ref 0.65–1.3)
LYMPHOCYTES # BLD AUTO: 1.6 K/UL — SIGNIFICANT CHANGE UP (ref 1.2–3.4)
LYMPHOCYTES # BLD AUTO: 37.9 % — SIGNIFICANT CHANGE UP (ref 20.5–51.1)
MCHC RBC-ENTMCNC: 28.9 PG — SIGNIFICANT CHANGE UP (ref 27–31)
MCHC RBC-ENTMCNC: 32.4 G/DL — SIGNIFICANT CHANGE UP (ref 32–37)
MCV RBC AUTO: 89.3 FL — SIGNIFICANT CHANGE UP (ref 81–99)
MONOCYTES # BLD AUTO: 0.33 K/UL — SIGNIFICANT CHANGE UP (ref 0.1–0.6)
MONOCYTES NFR BLD AUTO: 7.8 % — SIGNIFICANT CHANGE UP (ref 1.7–9.3)
NEUTROPHILS # BLD AUTO: 2.1 K/UL — SIGNIFICANT CHANGE UP (ref 1.4–6.5)
NEUTROPHILS NFR BLD AUTO: 49.8 % — SIGNIFICANT CHANGE UP (ref 42.2–75.2)
NRBC # BLD: 0 /100 WBCS — SIGNIFICANT CHANGE UP (ref 0–0)
PLATELET # BLD AUTO: 233 K/UL — SIGNIFICANT CHANGE UP (ref 130–400)
POTASSIUM SERPL-MCNC: 4.6 MMOL/L — SIGNIFICANT CHANGE UP (ref 3.5–5)
POTASSIUM SERPL-SCNC: 4.6 MMOL/L — SIGNIFICANT CHANGE UP (ref 3.5–5)
PROT SERPL-MCNC: 7.3 G/DL — SIGNIFICANT CHANGE UP (ref 6–8)
PROTHROM AB SERPL-ACNC: 10.6 SEC — SIGNIFICANT CHANGE UP (ref 9.95–12.87)
RBC # BLD: 4.19 M/UL — LOW (ref 4.2–5.4)
RBC # FLD: 13.4 % — SIGNIFICANT CHANGE UP (ref 11.5–14.5)
SODIUM SERPL-SCNC: 140 MMOL/L — SIGNIFICANT CHANGE UP (ref 135–146)
WBC # BLD: 4.22 K/UL — LOW (ref 4.8–10.8)
WBC # FLD AUTO: 4.22 K/UL — LOW (ref 4.8–10.8)

## 2020-03-11 PROCEDURE — 93010 ELECTROCARDIOGRAM REPORT: CPT

## 2020-03-11 RX ORDER — GABAPENTIN 400 MG/1
1 CAPSULE ORAL
Qty: 0 | Refills: 0 | DISCHARGE

## 2020-03-11 RX ORDER — SUMATRIPTAN SUCCINATE 4 MG/.5ML
0 INJECTION, SOLUTION SUBCUTANEOUS
Qty: 0 | Refills: 0 | DISCHARGE

## 2020-03-11 RX ORDER — DULOXETINE HYDROCHLORIDE 30 MG/1
1 CAPSULE, DELAYED RELEASE ORAL
Qty: 0 | Refills: 0 | DISCHARGE

## 2020-03-11 NOTE — H&P PST ADULT - NSICDXPASTMEDICALHX_GEN_ALL_CORE_FT
PAST MEDICAL HISTORY:  Anxiety     Asthma     Depression     Fibromyalgia     H/O gastroesophageal reflux (GERD)     Migraine without status migrainosus, not intractable, unspecified migraine type

## 2020-03-11 NOTE — H&P PST ADULT - NSICDXFAMILYHX_GEN_ALL_CORE_FT
FAMILY HISTORY:  FH: lung cancer, father, cousin  FH: stomach cancer, grandfather  FHx: breast cancer, mother

## 2020-03-11 NOTE — H&P PST ADULT - NSICDXPASTSURGICALHX_GEN_ALL_CORE_FT
PAST SURGICAL HISTORY:  H/O knee surgery 3/2019    H/O sinus surgery 2/15/2019    History of surgery tubal ligation  uterine ablation  pelvic sling

## 2020-03-11 NOTE — H&P PST ADULT - HISTORY OF PRESENT ILLNESS
53 Y/O FEMALE PRESENTS TO PAST WITH HX CHOLELITHIASIS   PT NOW FOR SCHEDULED PROCEDURE ( LAP JOAQUIN) . PT DENIES ANY CP SOB PALP COUGH DYSURIA FEVER URI. PT ABLE TO BERTA 1-2 FOS W/O SOB

## 2020-03-12 ENCOUNTER — APPOINTMENT (OUTPATIENT)
Dept: SURGERY | Facility: CLINIC | Age: 55
End: 2020-03-12
Payer: COMMERCIAL

## 2020-03-12 VITALS
DIASTOLIC BLOOD PRESSURE: 70 MMHG | WEIGHT: 159 LBS | HEIGHT: 67 IN | BODY MASS INDEX: 24.96 KG/M2 | TEMPERATURE: 98.2 F | SYSTOLIC BLOOD PRESSURE: 105 MMHG | HEART RATE: 85 BPM

## 2020-03-12 DIAGNOSIS — K82.4 CHOLESTEROLOSIS OF GALLBLADDER: ICD-10-CM

## 2020-03-12 PROCEDURE — 99202 OFFICE O/P NEW SF 15 MIN: CPT

## 2020-03-13 PROBLEM — K82.4 GALLBLADDER POLYP: Status: ACTIVE | Noted: 2020-03-13

## 2020-03-13 NOTE — ASSESSMENT
[FreeTextEntry1] : Gallbladder polyps as described, which I do not believe are the cause of her symptoms. She does not have stones or biliary dyskinesia but she is tender over the region of the gallbladder. Her symptoms are most likely due to her peptic disease and the patient understands that a cholecystectomy may not relieve her symptoms. She is concerned about the polyps and we did discuss the small likelihood of malignant transformation, she has the option of continued observation with a repeat ultrasound in 3-6 months to check for enlargement. She wishes to have a prompt cholecystectomy and the laparoscopic procedure was discussed in full with its risks and benefits, including the possibility of open conversion as may be deemed necessary at that time. All her questions were answered and she understands and agrees.  The procedure will be scheduled within the next few weeks, and they are happy with the assessment and plan.

## 2020-03-13 NOTE — PHYSICAL EXAM
[Normal Thyroid] : the thyroid was normal [Normal Breath Sounds] : Normal breath sounds [Normal Heart Sounds] : normal heart sounds [Normal Rate and Rhythm] : normal rate and rhythm [Abdominal Masses] : No abdominal masses [No HSM] : no hepatosplenomegaly [de-identified] : healthy and anicteric [de-identified] : no adenopathy [de-identified] : Soft and not distended. The gallbladder not palpable. She has mild tenderness in the lateral aspect of the right upper quadrant, but the Thomas sign is negative. There is no other abdominal or CVA tenderness. No hernias are noted.

## 2020-03-13 NOTE — REASON FOR VISIT
[Initial Evaluation] : an initial evaluation [Friend] : friend [FreeTextEntry1] : gallbladder polyps

## 2020-03-13 NOTE — HISTORY OF PRESENT ILLNESS
[de-identified] : The patient comes with her male  to be evaluated for a cholecystectomy. She was originally scheduled to have this done with Dr. Moyer.  She has a recent history of crampy right upper quadrant pain after meals along with explosive diarrhea. She has bloating and belching but is no better on a fat-free diet. She had a weight loss of about 25 pounds last year while on a diet but her weight is now stable, and she has no history of hepatitis, pancreatitis, or jaundice. An EGD done last month showed gastritis, duodenitis, esophagitis, and she does feel somewhat better that she is now on a PPI.  She has never had any abdominal surgery.

## 2020-03-13 NOTE — DATA REVIEWED
[FreeTextEntry1] : Abdominal sonogram from last week showed 2 gallbladder polyps, the largest at 9 mm, and no gallstones, acute changes, or biliary dilation.\par HIDA scan with CCK done earlier today was normal with a GB ejection fraction of 45%.

## 2020-04-23 ENCOUNTER — TRANSCRIPTION ENCOUNTER (OUTPATIENT)
Age: 55
End: 2020-04-23

## 2020-04-23 PROBLEM — Z87.19 PERSONAL HISTORY OF OTHER DISEASES OF THE DIGESTIVE SYSTEM: Chronic | Status: ACTIVE | Noted: 2020-03-11

## 2020-04-23 PROBLEM — F41.9 ANXIETY DISORDER, UNSPECIFIED: Chronic | Status: ACTIVE | Noted: 2020-03-11

## 2020-04-28 ENCOUNTER — APPOINTMENT (OUTPATIENT)
Dept: OTOLARYNGOLOGY | Facility: CLINIC | Age: 55
End: 2020-04-28
Payer: COMMERCIAL

## 2020-04-28 PROCEDURE — 99441: CPT

## 2020-05-01 ENCOUNTER — APPOINTMENT (OUTPATIENT)
Dept: OTOLARYNGOLOGY | Facility: CLINIC | Age: 55
End: 2020-05-01
Payer: COMMERCIAL

## 2020-05-01 DIAGNOSIS — M26.622 ARTHRALGIA OF LEFT TEMPOROMANDIBULAR JOINT: ICD-10-CM

## 2020-05-01 DIAGNOSIS — H92.02 OTALGIA, LEFT EAR: ICD-10-CM

## 2020-05-01 PROCEDURE — 92567 TYMPANOMETRY: CPT

## 2020-05-01 PROCEDURE — 99213 OFFICE O/P EST LOW 20 MIN: CPT | Mod: 25

## 2020-05-01 NOTE — HISTORY OF PRESENT ILLNESS
[FreeTextEntry1] : \par 2/20/19 Patient is s/p septoplasty 2/10/19. Overall doing well. Using saline spray. \par \par 5/1/20: Patient presents today with c/o left otalgia since early April 2020. She has been on amoxicillin then augmentin.With no improvement or worsening. Patient c/o excessive itching. She feels the pain go to her throat at times. She admits hearing loss is dull. Patient denies any history of ear infections in the past.  [de-identified] : 53 Year old patient is present today with chronic sinus problems for ~20 years. She had severe seasonal allergies and asthma. Sinus pressure and problems started in 1994. Facial pain and pressure over forehead and cheekbones x 8 years. She has used Flonase in the past, with some benefit at night. She denies current rhinorrhea however in the winter she has post-nasal drainage. She has periods of time where her symptoms are moderate, notably in the summer, other times of the year, symptoms more severe in remaining parts of the year. She reports nasal congestion on the left side worse than right. Nasal congestion is mild. She was allergy tested, 21 of 40 skin tests were positive.  She did not start allergy testing. She was taking Singulair and Allegra, no more refills on Singulair so only on Allegra. She uses CPAP for AZAEL, AHI 7 in March 2018.\par \par Patient has nasal congestion mostly during the night. Patient does take allegra during the day, and patient states it seem to help her during the day. She does have a deviated septum. Dr Montana talked to her about balloon procedures.\par \par  9/13/18: Patient following up on sinusitis. Patient had CT performed. Brings CD of sinuses and allergy test results from 1 year ago. Has not made f/up appointment with allergist. Did not have Singulair refilled yet due to pharmacy issues. \par \par 11/20/18 patient is following up for  seasonal allergic rhinitis. She started taking Singulair and has some improvement. She still c/o of nasal dryness, worse at night. Is using humidifier. Is using saline gel, without much benefit.  She has seen an allergist and has begun allergy testing, has another visit set up.  Tinnitus in right ear for 2-3 days. Unsure if hearing loss. She has a UTI, started on cipro last week. Is not currently using her CPAP.

## 2020-05-01 NOTE — DISCUSSION/SUMMARY
[Verbal consent obtained from patient] : the patient, [unfilled] [Time Spent: ___ minutes] : I have spent [unfilled] minutes with the patient on the telephone [FreeTextEntry1] : Patient initially scheduled for Telehealth. She was unable to open link to attend Telehealth meeting. Reviewed her symptoms with her via phone. She reports 1 week ago being placed on amoxcilin by Dr Sharma for ear pain and fullness. Her symptoms persisted so she went to urgent care where she was placed on Augmentin. She has continued to have pain, fullness and discomfort in her ear. Reviewed with her that in order to determine what may be going on in her ear, I will need to see her in person. Scheduled for 5/1 at 8:45am. Her questions were answered and she voices understanding.

## 2020-05-01 NOTE — ASSESSMENT
[FreeTextEntry1] : - no evidence of ear infection, appears to have resolved\par - suspect ear pain is TMJ related\par - recommend PRN analgesia\par - avoid tough chewy foods\par - warm compresses

## 2020-05-01 NOTE — CONSULT LETTER
[facility] Emergency Services  [address]  [facility] [address]  Phone: [phone]    Name:  Gómez Pavon  Current Date: 2017  : 1978  MRN: [id number]   SEN: [id number]    Visit Date: 2017  [facility] [place] [address]  Phone: [phone]    Primary Care Provider     Name: Jacques Storm MD    Phone: [phone]    The staff of [facility] would like to thank you for allowing us to assist you with your healthcare needs. The following includes patient education materials and information on how best to care for your illness/injury at home and when to see a physician. If you need to locate a Doctor or clinic close to you, please call the Doctor Referral Service at [phone]. The Service is available Monday through Thursday from 8 AM to 8 PM and  from 8 AM to 4 PM.    Patients Please Note: If further time off is required, or a medical clearance to return to work is required, it must be obtained through your primary physician.  Return to work clearances and extensions of \"Time-Off\" will not be given by the Emergency Department.     We hope that you leave our Emergency Department believing that we provided you with very good care.   Your Opinion Matters To Us  If you receive a patient satisfaction survey in the mail, please complete and return it in the postage-paid envelope.  We truly value and appreciate your feedback.  Emergency Department Care Providers   Physician:Trever Lagunas MD    Advanced Practice Provider:  No providers found     RN_________________ ED Tech__________________ Clerical_________________         [Dear  ___] : Dear  [unfilled], [Consult Letter:] : I had the pleasure of evaluating your patient, [unfilled]. [Please see my note below.] : Please see my note below. [Consult Closing:] : Thank you very much for allowing me to participate in the care of this patient.  If you have any questions, please do not hesitate to contact me. [Sincerely,] : Sincerely, [FreeTextEntry2] : Dr Micheal Sharma  [FreeTextEntry3] : Shannon De Jesus MD\par Otolaryngology - Head & Neck Surgery\par \par

## 2020-05-01 NOTE — PHYSICAL EXAM
[de-identified] : TTP left TMJ [Midline] : trachea located in midline position [Normal] : no rashes

## 2020-06-10 ENCOUNTER — OUTPATIENT (OUTPATIENT)
Dept: OUTPATIENT SERVICES | Facility: HOSPITAL | Age: 55
LOS: 1 days | Discharge: HOME | End: 2020-06-10
Payer: COMMERCIAL

## 2020-06-10 VITALS
OXYGEN SATURATION: 98 % | DIASTOLIC BLOOD PRESSURE: 76 MMHG | TEMPERATURE: 97 F | SYSTOLIC BLOOD PRESSURE: 145 MMHG | RESPIRATION RATE: 20 BRPM | HEART RATE: 83 BPM | HEIGHT: 66 IN

## 2020-06-10 DIAGNOSIS — Z01.818 ENCOUNTER FOR OTHER PREPROCEDURAL EXAMINATION: ICD-10-CM

## 2020-06-10 DIAGNOSIS — K80.20 CALCULUS OF GALLBLADDER WITHOUT CHOLECYSTITIS WITHOUT OBSTRUCTION: ICD-10-CM

## 2020-06-10 DIAGNOSIS — Z98.890 OTHER SPECIFIED POSTPROCEDURAL STATES: Chronic | ICD-10-CM

## 2020-06-10 LAB
ALBUMIN SERPL ELPH-MCNC: 4.7 G/DL — SIGNIFICANT CHANGE UP (ref 3.5–5.2)
ALP SERPL-CCNC: 122 U/L — HIGH (ref 30–115)
ALT FLD-CCNC: 46 U/L — HIGH (ref 0–41)
ANION GAP SERPL CALC-SCNC: 13 MMOL/L — SIGNIFICANT CHANGE UP (ref 7–14)
APTT BLD: 33.1 SEC — SIGNIFICANT CHANGE UP (ref 27–39.2)
AST SERPL-CCNC: 31 U/L — SIGNIFICANT CHANGE UP (ref 0–41)
BASOPHILS # BLD AUTO: 0.04 K/UL — SIGNIFICANT CHANGE UP (ref 0–0.2)
BASOPHILS NFR BLD AUTO: 0.7 % — SIGNIFICANT CHANGE UP (ref 0–1)
BILIRUB SERPL-MCNC: <0.2 MG/DL — SIGNIFICANT CHANGE UP (ref 0.2–1.2)
BUN SERPL-MCNC: 17 MG/DL — SIGNIFICANT CHANGE UP (ref 10–20)
CALCIUM SERPL-MCNC: 9.5 MG/DL — SIGNIFICANT CHANGE UP (ref 8.5–10.1)
CHLORIDE SERPL-SCNC: 101 MMOL/L — SIGNIFICANT CHANGE UP (ref 98–110)
CO2 SERPL-SCNC: 28 MMOL/L — SIGNIFICANT CHANGE UP (ref 17–32)
CREAT SERPL-MCNC: 0.8 MG/DL — SIGNIFICANT CHANGE UP (ref 0.7–1.5)
EOSINOPHIL # BLD AUTO: 0.11 K/UL — SIGNIFICANT CHANGE UP (ref 0–0.7)
EOSINOPHIL NFR BLD AUTO: 1.8 % — SIGNIFICANT CHANGE UP (ref 0–8)
GLUCOSE SERPL-MCNC: 93 MG/DL — SIGNIFICANT CHANGE UP (ref 70–99)
HCT VFR BLD CALC: 35.2 % — LOW (ref 37–47)
HGB BLD-MCNC: 11.8 G/DL — LOW (ref 12–16)
IMM GRANULOCYTES NFR BLD AUTO: 0.3 % — SIGNIFICANT CHANGE UP (ref 0.1–0.3)
INR BLD: 0.94 RATIO — SIGNIFICANT CHANGE UP (ref 0.65–1.3)
LYMPHOCYTES # BLD AUTO: 1.65 K/UL — SIGNIFICANT CHANGE UP (ref 1.2–3.4)
LYMPHOCYTES # BLD AUTO: 26.9 % — SIGNIFICANT CHANGE UP (ref 20.5–51.1)
MCHC RBC-ENTMCNC: 29.6 PG — SIGNIFICANT CHANGE UP (ref 27–31)
MCHC RBC-ENTMCNC: 33.5 G/DL — SIGNIFICANT CHANGE UP (ref 32–37)
MCV RBC AUTO: 88.4 FL — SIGNIFICANT CHANGE UP (ref 81–99)
MONOCYTES # BLD AUTO: 0.44 K/UL — SIGNIFICANT CHANGE UP (ref 0.1–0.6)
MONOCYTES NFR BLD AUTO: 7.2 % — SIGNIFICANT CHANGE UP (ref 1.7–9.3)
NEUTROPHILS # BLD AUTO: 3.88 K/UL — SIGNIFICANT CHANGE UP (ref 1.4–6.5)
NEUTROPHILS NFR BLD AUTO: 63.1 % — SIGNIFICANT CHANGE UP (ref 42.2–75.2)
NRBC # BLD: 0 /100 WBCS — SIGNIFICANT CHANGE UP (ref 0–0)
PLATELET # BLD AUTO: 219 K/UL — SIGNIFICANT CHANGE UP (ref 130–400)
POTASSIUM SERPL-MCNC: 4 MMOL/L — SIGNIFICANT CHANGE UP (ref 3.5–5)
POTASSIUM SERPL-SCNC: 4 MMOL/L — SIGNIFICANT CHANGE UP (ref 3.5–5)
PROT SERPL-MCNC: 7.4 G/DL — SIGNIFICANT CHANGE UP (ref 6–8)
PROTHROM AB SERPL-ACNC: 10.8 SEC — SIGNIFICANT CHANGE UP (ref 9.95–12.87)
RBC # BLD: 3.98 M/UL — LOW (ref 4.2–5.4)
RBC # FLD: 13.2 % — SIGNIFICANT CHANGE UP (ref 11.5–14.5)
SODIUM SERPL-SCNC: 142 MMOL/L — SIGNIFICANT CHANGE UP (ref 135–146)
WBC # BLD: 6.14 K/UL — SIGNIFICANT CHANGE UP (ref 4.8–10.8)
WBC # FLD AUTO: 6.14 K/UL — SIGNIFICANT CHANGE UP (ref 4.8–10.8)

## 2020-06-10 PROCEDURE — 93010 ELECTROCARDIOGRAM REPORT: CPT

## 2020-06-10 RX ORDER — RABEPRAZOLE 20 MG/1
0 TABLET, DELAYED RELEASE ORAL
Qty: 0 | Refills: 0 | DISCHARGE

## 2020-06-10 NOTE — H&P PST ADULT - HISTORY OF PRESENT ILLNESS
56 y/o female scheduled for lap johnnie   reports no c/o cp,sob,palpitations,cough or dysuria  1-2 fos without sob

## 2020-06-10 NOTE — H&P PST ADULT - PRO INTERPRETER NEED 2
Ferritin super low, ASO elevated (possible anti-basal ganglia strep antibodies as cause of her tic disorder and psychiatric history).    I want to bring her back to discuss ferritin infusions, recheck ASO and consider IVIG or steroids.  Will come 11/14 at 1pm.  Also, will go ahead and set up the iron infusions on [facility].        Lab Visit on 11/01/2017   Component Date Value Ref Range Status    Lithium Lvl 11/01/2017 <0.1* 0.6 - 1.2 mmol/L Final    Ferritin 11/01/2017 11* 20.0 - 300.0 ng/mL Final    Iron 11/01/2017 42  30 - 160 ug/dL Final    Transferrin 11/01/2017 345  200 - 375 mg/dL Final    TIBC 11/01/2017 511* 250 - 450 ug/dL Final    Saturated Iron 11/01/2017 8* 20 - 50 % Final    Vitamin B-12 11/01/2017 313  210 - 950 pg/mL Final    Ceruloplasmin 11/01/2017 44.0  15.0 - 45.0 mg/dL Final    ASO TITER 11/02/2017 420* <200 IU/mL Final    Comment: Anti-streptolysin O (ASO) is negative in 15-20%  of patients with current or prior streptococcal  infections.  DNase B antibody is positive in 10-15%  of ASO negative patients.  Please contact the   laboratory if you wish to add testing for DNase B.  Test performed at [facility],  [address], [place]  [address]     [phone]  Leno Messina MD  - Medical Director          English

## 2020-06-12 NOTE — ASU PATIENT PROFILE, ADULT - PMH
Anxiety    Asthma    Depression    Fibromyalgia    H/O gastroesophageal reflux (GERD)    Migraine without status migrainosus, not intractable, unspecified migraine type

## 2020-06-15 ENCOUNTER — RESULT REVIEW (OUTPATIENT)
Age: 55
End: 2020-06-15

## 2020-06-15 ENCOUNTER — OUTPATIENT (OUTPATIENT)
Dept: OUTPATIENT SERVICES | Facility: HOSPITAL | Age: 55
LOS: 1 days | Discharge: HOME | End: 2020-06-15
Payer: COMMERCIAL

## 2020-06-15 VITALS — DIASTOLIC BLOOD PRESSURE: 69 MMHG | HEART RATE: 73 BPM | SYSTOLIC BLOOD PRESSURE: 127 MMHG

## 2020-06-15 VITALS
HEART RATE: 85 BPM | TEMPERATURE: 98 F | HEIGHT: 66 IN | DIASTOLIC BLOOD PRESSURE: 74 MMHG | WEIGHT: 171.96 LBS | OXYGEN SATURATION: 99 % | RESPIRATION RATE: 18 BRPM | SYSTOLIC BLOOD PRESSURE: 142 MMHG

## 2020-06-15 DIAGNOSIS — Z98.890 OTHER SPECIFIED POSTPROCEDURAL STATES: Chronic | ICD-10-CM

## 2020-06-15 PROCEDURE — 88304 TISSUE EXAM BY PATHOLOGIST: CPT | Mod: 26

## 2020-06-15 RX ORDER — DULOXETINE HYDROCHLORIDE 30 MG/1
1 CAPSULE, DELAYED RELEASE ORAL
Qty: 0 | Refills: 0 | DISCHARGE

## 2020-06-15 RX ORDER — ONDANSETRON 8 MG/1
4 TABLET, FILM COATED ORAL ONCE
Refills: 0 | Status: DISCONTINUED | OUTPATIENT
Start: 2020-06-15 | End: 2020-06-16

## 2020-06-15 RX ORDER — HYDROMORPHONE HYDROCHLORIDE 2 MG/ML
1 INJECTION INTRAMUSCULAR; INTRAVENOUS; SUBCUTANEOUS
Refills: 0 | Status: DISCONTINUED | OUTPATIENT
Start: 2020-06-15 | End: 2020-06-16

## 2020-06-15 RX ORDER — SODIUM CHLORIDE 9 MG/ML
1000 INJECTION, SOLUTION INTRAVENOUS
Refills: 0 | Status: DISCONTINUED | OUTPATIENT
Start: 2020-06-15 | End: 2020-06-16

## 2020-06-15 RX ORDER — DULOXETINE HYDROCHLORIDE 30 MG/1
0 CAPSULE, DELAYED RELEASE ORAL
Qty: 0 | Refills: 0 | DISCHARGE

## 2020-06-15 RX ORDER — MIRTAZAPINE 45 MG/1
1 TABLET, ORALLY DISINTEGRATING ORAL
Qty: 0 | Refills: 0 | DISCHARGE

## 2020-06-15 RX ORDER — DICLOFENAC SODIUM/MISOPROSTOL 50 MG-200
1 TABLET,IMMEDIATE,DELAY RELEASE,BIPHASE ORAL
Qty: 0 | Refills: 0 | DISCHARGE

## 2020-06-15 RX ORDER — HYDROMORPHONE HYDROCHLORIDE 2 MG/ML
0.5 INJECTION INTRAMUSCULAR; INTRAVENOUS; SUBCUTANEOUS
Refills: 0 | Status: DISCONTINUED | OUTPATIENT
Start: 2020-06-15 | End: 2020-06-16

## 2020-06-15 RX ADMIN — SODIUM CHLORIDE 75 MILLILITER(S): 9 INJECTION, SOLUTION INTRAVENOUS at 10:47

## 2020-06-15 RX ADMIN — HYDROMORPHONE HYDROCHLORIDE 1 MILLIGRAM(S): 2 INJECTION INTRAMUSCULAR; INTRAVENOUS; SUBCUTANEOUS at 10:53

## 2020-06-15 RX ADMIN — HYDROMORPHONE HYDROCHLORIDE 0.5 MILLIGRAM(S): 2 INJECTION INTRAMUSCULAR; INTRAVENOUS; SUBCUTANEOUS at 11:24

## 2020-06-15 RX ADMIN — HYDROMORPHONE HYDROCHLORIDE 1 MILLIGRAM(S): 2 INJECTION INTRAMUSCULAR; INTRAVENOUS; SUBCUTANEOUS at 11:03

## 2020-06-15 NOTE — ASU PREOP CHECKLIST - TO WHOM
opportunity for questions/ answers rendered B Taniya CONROY opportunity for questions/ answers rendered

## 2020-06-15 NOTE — ASU DISCHARGE PLAN (ADULT/PEDIATRIC) - CARE PROVIDER_API CALL
Kuldeep Moyer  Surgery  [address] [place] [address]  Phone: [phone]  Fax: [phone]  Follow Up Time: 2 weeks

## 2020-06-15 NOTE — ASU DISCHARGE PLAN (ADULT/PEDIATRIC) - CALL YOUR DOCTOR IF YOU HAVE ANY OF THE FOLLOWING:
Pain not relieved by Medications/Swelling that gets worse/Fever greater than (need to indicate Fahrenheit or Celsius)/Bleeding that does not stop/Nausea and vomiting that does not stop

## 2020-06-16 LAB — SURGICAL PATHOLOGY STUDY: SIGNIFICANT CHANGE UP

## 2020-06-17 DIAGNOSIS — Z88.2 ALLERGY STATUS TO SULFONAMIDES: ICD-10-CM

## 2020-06-17 DIAGNOSIS — K82.4 CHOLESTEROLOSIS OF GALLBLADDER: ICD-10-CM

## 2020-06-17 DIAGNOSIS — F32.9 MAJOR DEPRESSIVE DISORDER, SINGLE EPISODE, UNSPECIFIED: ICD-10-CM

## 2020-06-17 DIAGNOSIS — K81.1 CHRONIC CHOLECYSTITIS: ICD-10-CM

## 2020-06-17 DIAGNOSIS — F41.9 ANXIETY DISORDER, UNSPECIFIED: ICD-10-CM

## 2020-06-17 DIAGNOSIS — J45.909 UNSPECIFIED ASTHMA, UNCOMPLICATED: ICD-10-CM

## 2021-02-03 ENCOUNTER — OUTPATIENT (OUTPATIENT)
Dept: OUTPATIENT SERVICES | Facility: HOSPITAL | Age: 56
LOS: 1 days | Discharge: HOME | End: 2021-02-03
Payer: COMMERCIAL

## 2021-02-03 DIAGNOSIS — Z98.890 OTHER SPECIFIED POSTPROCEDURAL STATES: Chronic | ICD-10-CM

## 2021-02-03 DIAGNOSIS — Z12.31 ENCOUNTER FOR SCREENING MAMMOGRAM FOR MALIGNANT NEOPLASM OF BREAST: ICD-10-CM

## 2021-02-03 PROCEDURE — 77063 BREAST TOMOSYNTHESIS BI: CPT | Mod: 26

## 2021-02-03 PROCEDURE — 77067 SCR MAMMO BI INCL CAD: CPT | Mod: 26

## 2021-02-12 ENCOUNTER — OUTPATIENT (OUTPATIENT)
Dept: OUTPATIENT SERVICES | Facility: HOSPITAL | Age: 56
LOS: 1 days | Discharge: HOME | End: 2021-02-12
Payer: COMMERCIAL

## 2021-02-12 DIAGNOSIS — Z98.890 OTHER SPECIFIED POSTPROCEDURAL STATES: Chronic | ICD-10-CM

## 2021-02-12 DIAGNOSIS — R92.8 OTHER ABNORMAL AND INCONCLUSIVE FINDINGS ON DIAGNOSTIC IMAGING OF BREAST: ICD-10-CM

## 2021-02-12 PROCEDURE — G0279: CPT | Mod: 26

## 2021-02-12 PROCEDURE — 77065 DX MAMMO INCL CAD UNI: CPT | Mod: 26,RT

## 2021-02-12 PROCEDURE — 76641 ULTRASOUND BREAST COMPLETE: CPT | Mod: 26,RT

## 2021-12-07 NOTE — BRIEF OPERATIVE NOTE - DISPOSITION
Insurance has denied coverage for medication - They will not cover 2 pills daily - please consider switching the dose of tablet so paitent only has to take 1 daily
PA has been submitted for amlodipine  Insurance [place]  Waiting on response back
Home

## 2022-07-22 ENCOUNTER — APPOINTMENT (OUTPATIENT)
Dept: NEUROSURGERY | Facility: CLINIC | Age: 57
End: 2022-07-22

## 2022-07-22 VITALS — WEIGHT: 158 LBS | BODY MASS INDEX: 25.39 KG/M2 | HEIGHT: 66 IN

## 2022-07-22 DIAGNOSIS — Z87.19 PERSONAL HISTORY OF OTHER DISEASES OF THE DIGESTIVE SYSTEM: ICD-10-CM

## 2022-07-22 DIAGNOSIS — Z87.11 PERSONAL HISTORY OF PEPTIC ULCER DISEASE: ICD-10-CM

## 2022-07-22 DIAGNOSIS — Z86.59 PERSONAL HISTORY OF OTHER MENTAL AND BEHAVIORAL DISORDERS: ICD-10-CM

## 2022-07-22 DIAGNOSIS — Z87.01 PERSONAL HISTORY OF PNEUMONIA (RECURRENT): ICD-10-CM

## 2022-07-22 DIAGNOSIS — G43.909 MIGRAINE, UNSPECIFIED, NOT INTRACTABLE, W/OUT STATUS MIGRAINOSUS: ICD-10-CM

## 2022-07-22 PROCEDURE — 99202 OFFICE O/P NEW SF 15 MIN: CPT

## 2022-07-25 ENCOUNTER — APPOINTMENT (OUTPATIENT)
Dept: OTOLARYNGOLOGY | Facility: CLINIC | Age: 57
End: 2022-07-25

## 2022-07-25 DIAGNOSIS — L29.9 PRURITUS, UNSPECIFIED: ICD-10-CM

## 2022-07-25 DIAGNOSIS — H91.90 UNSPECIFIED HEARING LOSS, UNSPECIFIED EAR: ICD-10-CM

## 2022-07-25 DIAGNOSIS — R26.89 OTHER ABNORMALITIES OF GAIT AND MOBILITY: ICD-10-CM

## 2022-07-25 DIAGNOSIS — J34.3 HYPERTROPHY OF NASAL TURBINATES: ICD-10-CM

## 2022-07-25 PROCEDURE — 99214 OFFICE O/P EST MOD 30 MIN: CPT | Mod: 25

## 2022-07-25 PROCEDURE — 92557 COMPREHENSIVE HEARING TEST: CPT

## 2022-07-25 PROCEDURE — 92550 TYMPANOMETRY & REFLEX THRESH: CPT

## 2022-07-25 RX ORDER — MOMETASONE 50 UG/1
50 SPRAY, METERED NASAL DAILY
Qty: 1 | Refills: 3 | Status: ACTIVE | COMMUNITY
Start: 2022-07-25 | End: 1900-01-01

## 2022-07-25 NOTE — DATA REVIEWED
[de-identified] :  reviewed by me. B/L WNL \par  [de-identified] : MRI: 8 mm caudal right maxillary sinus polyp or mucous retention cyst, unchanged. 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

## 2022-07-25 NOTE — REASON FOR VISIT
[Subsequent Evaluation] : a subsequent evaluation for [FreeTextEntry2] : fluid on brain , nasal polyp

## 2022-07-25 NOTE — HISTORY OF PRESENT ILLNESS
[FreeTextEntry1] : Patient presents today c/o fluid on brain found on MRI PACS and nasal polyp .   Patient had a MRI PACS done on 06/20/22.  Pt is complaining of brain fog.

## 2022-07-26 ENCOUNTER — OUTPATIENT (OUTPATIENT)
Dept: OUTPATIENT SERVICES | Facility: HOSPITAL | Age: 57
LOS: 1 days | Discharge: HOME | End: 2022-07-26

## 2022-07-26 DIAGNOSIS — Z98.890 OTHER SPECIFIED POSTPROCEDURAL STATES: Chronic | ICD-10-CM

## 2022-07-26 DIAGNOSIS — Z12.31 ENCOUNTER FOR SCREENING MAMMOGRAM FOR MALIGNANT NEOPLASM OF BREAST: ICD-10-CM

## 2022-07-26 PROCEDURE — 77063 BREAST TOMOSYNTHESIS BI: CPT | Mod: 26

## 2022-07-26 PROCEDURE — 77067 SCR MAMMO BI INCL CAD: CPT | Mod: 26

## 2022-07-27 ENCOUNTER — OUTPATIENT (OUTPATIENT)
Dept: OUTPATIENT SERVICES | Facility: HOSPITAL | Age: 57
LOS: 1 days | Discharge: HOME | End: 2022-07-27

## 2022-07-27 DIAGNOSIS — Z98.890 OTHER SPECIFIED POSTPROCEDURAL STATES: Chronic | ICD-10-CM

## 2022-07-27 DIAGNOSIS — R26.89 OTHER ABNORMALITIES OF GAIT AND MOBILITY: ICD-10-CM

## 2022-08-14 NOTE — HISTORY OF PRESENT ILLNESS
[de-identified] : I reviewed this very pleasant lady today in the neurosurgery clinic who is accompanied by family member for a neurosurgical opinion related to a lesion that was discovered along the right sided petrous apex on MRI scanning of her brain.  She reports some dizziness and fogginess/clouding which prompted an MRI scan of her brain.  I personally reviewed the MRI scan of the brain that was performed at an outside institution as well as the accompanying report.\par \par This lesion appears to be in the petrous apex and does not appear to be intradural or intrinsic brain lesion.  The most likely explanation of the lesion is that it is related to known petrous apex and inner ear/middle ear but at the moment I cannot explain any of her symptoms directly related to this finding.\par \par My plan would be to obtain another MRI scan of the brain with gadolinium in approximately 1 years time and to obtain an up-to-date ENT opinion.  She was satisfied with the outcome of the consultation and no further questions.\par \par Time spent during consultation was approximately 25 minutes.

## 2022-08-14 NOTE — HISTORY OF PRESENT ILLNESS
[de-identified] : I reviewed this very pleasant lady today in the neurosurgery clinic who is accompanied by family member for a neurosurgical opinion related to a lesion that was discovered along the right sided petrous apex on MRI scanning of her brain.  She reports some dizziness and fogginess/clouding which prompted an MRI scan of her brain.  I personally reviewed the MRI scan of the brain that was performed at an outside institution as well as the accompanying report.\par \par This lesion appears to be in the petrous apex and does not appear to be intradural or intrinsic brain lesion.  The most likely explanation of the lesion is that it is related to known petrous apex and inner ear/middle ear but at the moment I cannot explain any of her symptoms directly related to this finding.\par \par My plan would be to obtain another MRI scan of the brain with gadolinium in approximately 1 years time and to obtain an up-to-date ENT opinion.  She was satisfied with the outcome of the consultation and no further questions.\par \par Time spent during consultation was approximately 25 minutes.

## 2022-09-01 ENCOUNTER — OUTPATIENT (OUTPATIENT)
Dept: OUTPATIENT SERVICES | Facility: HOSPITAL | Age: 57
LOS: 1 days | Discharge: HOME | End: 2022-09-01

## 2022-09-01 ENCOUNTER — APPOINTMENT (OUTPATIENT)
Dept: SPEECH THERAPY | Facility: CLINIC | Age: 57
End: 2022-09-01

## 2022-09-01 DIAGNOSIS — Z98.890 OTHER SPECIFIED POSTPROCEDURAL STATES: Chronic | ICD-10-CM

## 2022-09-01 DIAGNOSIS — R42 DIZZINESS AND GIDDINESS: ICD-10-CM

## 2022-09-13 ENCOUNTER — TRANSCRIPTION ENCOUNTER (OUTPATIENT)
Age: 57
End: 2022-09-13

## 2022-09-21 ENCOUNTER — APPOINTMENT (OUTPATIENT)
Dept: OTOLARYNGOLOGY | Facility: CLINIC | Age: 57
End: 2022-09-21

## 2022-09-21 DIAGNOSIS — H81.393 OTHER PERIPHERAL VERTIGO, BILATERAL: ICD-10-CM

## 2022-09-21 PROCEDURE — 99214 OFFICE O/P EST MOD 30 MIN: CPT

## 2022-09-21 NOTE — HISTORY OF PRESENT ILLNESS
[FreeTextEntry1] : Patient following up today c/o mass of petrous temporal bone, balance problem , hearing loss , nasal polyp .  Patient states she still feels imbalanced and has brain fog still.  Patient went for MRI PACS on 06/20/2022 and  a VNG test done on 09/01/2022. Pt tried therapy in the past with mild improvement. Pt has seen neurosurgery and note was reviewed. Pt is unable to walk upstairs comfortably and losses balance. Pt seen by neurology as well.

## 2022-09-21 NOTE — REASON FOR VISIT
[Subsequent Evaluation] : a subsequent evaluation for [FreeTextEntry2] : mass of petrous temporal bone, balance problem , hearing loss , nasal polyp

## 2022-09-21 NOTE — DATA REVIEWED
[de-identified] : MRI: No intracranial abnormality identified\par 1.8 cm focus of increased T1/T2/ flair signal reidentified in the right petrous apex could represent small proteinaceous fluid in a right petrous apex air cell. Small cholesterol granulomas could produce this appearance however no associated expansion is seen. \par 8mm caudal right maxillary sinus polyp or mucus retention cyst, unchanged.\par

## 2022-10-07 ENCOUNTER — OUTPATIENT (OUTPATIENT)
Dept: OUTPATIENT SERVICES | Facility: HOSPITAL | Age: 57
LOS: 1 days | Discharge: HOME | End: 2022-10-07

## 2022-10-07 ENCOUNTER — APPOINTMENT (OUTPATIENT)
Dept: NEUROPSYCHOLOGY | Facility: CLINIC | Age: 57
End: 2022-10-07

## 2022-10-07 ENCOUNTER — APPOINTMENT (OUTPATIENT)
Dept: INTERNAL MEDICINE | Facility: CLINIC | Age: 57
End: 2022-10-07

## 2022-10-07 VITALS
HEIGHT: 66 IN | SYSTOLIC BLOOD PRESSURE: 108 MMHG | WEIGHT: 160 LBS | BODY MASS INDEX: 25.71 KG/M2 | HEART RATE: 77 BPM | DIASTOLIC BLOOD PRESSURE: 70 MMHG | OXYGEN SATURATION: 97 %

## 2022-10-07 DIAGNOSIS — Z00.00 ENCOUNTER FOR GENERAL ADULT MEDICAL EXAMINATION W/OUT ABNORMAL FINDINGS: ICD-10-CM

## 2022-10-07 DIAGNOSIS — Z98.890 OTHER SPECIFIED POSTPROCEDURAL STATES: Chronic | ICD-10-CM

## 2022-10-07 DIAGNOSIS — U09.9 POST COVID-19 CONDITION, UNSPECIFIED: ICD-10-CM

## 2022-10-07 DIAGNOSIS — F41.9 ANXIETY DISORDER, UNSPECIFIED: ICD-10-CM

## 2022-10-07 PROCEDURE — 99204 OFFICE O/P NEW MOD 45 MIN: CPT

## 2022-10-12 ENCOUNTER — APPOINTMENT (OUTPATIENT)
Dept: ORTHOPEDIC SURGERY | Facility: CLINIC | Age: 57
End: 2022-10-12

## 2022-10-12 DIAGNOSIS — G56.21 LESION OF ULNAR NERVE, RIGHT UPPER LIMB: ICD-10-CM

## 2022-10-12 DIAGNOSIS — M77.11 LATERAL EPICONDYLITIS, RIGHT ELBOW: ICD-10-CM

## 2022-10-12 PROCEDURE — 99204 OFFICE O/P NEW MOD 45 MIN: CPT

## 2022-10-12 NOTE — ASSESSMENT
[FreeTextEntry1] : The patient complains of right elbow pain.  She says she has numbness and tingling the small finger on the ring finger specially when she sleeps at night.  She has pain in the elbow.  It has been going on for months.  She has not had any treatment.  She says whenever she has been doing on her own has not helped her.\par \par R elbow:\par Tender at the ulna nerve\par Pain with flexion\par +tinels at the ulna nerve\par +hyperflexion test\par \par R hand:\par normal sensation in the ulna nerve distribution\par \par full active range of motion of the right shoulder, wrist and fingers\par full active range of motion of the right elbow\par Tenderness to palpation of the lateral epicondyle and proximal extensor supinator mass\par pain with resisted wrist extension and forearm supination\par 4/5 strength in supination and wrist extension, otherwise 5/5 strength\par median/ulnar/radial sensation intact to light touch\par ain/pin/ulnar motor intact\par palpable pulses CR<2s\par \par The patient was advised of the diagnosis. The natural history of the pathology was explained in full to the patient in layman's terms. All questions were answered. The risks and benefits of surgical and non-surgical treatment alternatives were explained in full to the patient.\par We discussed treatment options including nsaids, topical gels, tennis elbow strap, PT, activity modification, cortisone inj, sx .pt is aware that symptoms usually resolve on their own in 95-99% of people, but the timeframe is unknown.\par Home exercises/stretches were demonstrated and the patient practiced them as well- They are to do the exercises hourly and hold the stretch for 30 seconds. \par Avoid repetitive wrist flexion\par time was spent instructing the patient on appropriate placement of the elbow strap as well. \par \par   The patient was given a handout on the stretches that she should do.  Hopefully this will help her significantly.  It does not help her she will return in about 2 months and I will send her to therapy.\par \par The patient was advised of the diagnosis.  The natural history of the pathology was explained in full to the patient in layman's terms. We discussed the nature of the nerve as an electrical cable and what happens to the nerve in cubitaal tunnel syndrome.  We discussed that treatment for night symptoms included night bracing.  We discussed the use of nerve testing in cases when diagnosis was in doubt or for confirmation to exclude alternate pathology.  We discussed that if symptoms were 24/7 surgery was recommended to give the nerve the best chance to recover but that once symptoms were constant, the nerve may not recover even with surgery.  We discussed that if left alone the nerve progression could worsen and that treatment was indicated to prevent progression of nerve compression.  The longer the nerve is left, the more likely to cause worsening irreversible damage.  All questions were answered.  The risks and benefits of surgical and non-surgical treatment alternatives were explained in full to the patient.\par   The patient going to try make positional modifications.  She will do that so at night.  Hopefully this will help her.  She will most likely follow up back for this if it is not improving at that point we will consider an EMG NCV.

## 2022-10-24 ENCOUNTER — APPOINTMENT (OUTPATIENT)
Dept: NEUROPSYCHOLOGY | Facility: CLINIC | Age: 57
End: 2022-10-24

## 2022-10-25 ENCOUNTER — APPOINTMENT (OUTPATIENT)
Dept: OTOLARYNGOLOGY | Facility: CLINIC | Age: 57
End: 2022-10-25

## 2022-10-25 DIAGNOSIS — M89.8X8 OTHER SPECIFIED DISORDERS OF BONE, OTHER SITE: ICD-10-CM

## 2022-10-25 DIAGNOSIS — H90.3 SENSORINEURAL HEARING LOSS, BILATERAL: ICD-10-CM

## 2022-10-25 DIAGNOSIS — H81.10 BENIGN PAROXYSMAL VERTIGO, UNSPECIFIED EAR: ICD-10-CM

## 2022-10-25 PROCEDURE — 92504 EAR MICROSCOPY EXAMINATION: CPT

## 2022-10-25 PROCEDURE — 99214 OFFICE O/P EST MOD 30 MIN: CPT

## 2022-10-25 NOTE — HISTORY OF PRESENT ILLNESS
[de-identified] : CHARLEY CA has a history of Mass of Petrous temporal bone. Incidental finding on MRI in 2019.  Patient had MRI of brain on 6/20/22 with some increase in size. \par Being worked up for neuro cognitive issues possible related to long Covid.\par VNG in 6/22: incomplete\par In Vestibular PT for bppv\par Ear infections in the past, not for years.  [FreeTextEntry1] : 10/25/2022 \par perceived hearing loss unchanged for months. Bilateral tinnitus noted primarily at night.  Weekly headaches usually left side. Diagnosed with migraine. Positional dizziness reported lasting for a few seconds.

## 2022-10-25 NOTE — PHYSICAL EXAM
[FreeTextEntry1] : Procedure: Microscopic Ear Exam\par \par Left ear:  Ear canal intact without inflammation or lesion.  \par Tympanic membrane intact without inflammation.\par \par Right ear:  Ear canal intact without inflammation or lesion.  \par Tympanic membrane intact without inflammation.\par  [Midline] : trachea located in midline position [Normal] : no rashes

## 2022-10-25 NOTE — ASSESSMENT
[FreeTextEntry1] : Patient has a constellation of neurologic based symptoms.  She is apparently being evaluated for long Covid.\par \par Symptoms do not appear typical of a temporal bone origin but this remains a possibility.  Localized headaches have occurred on the opposite side.\par \par I have recommended CT imaging to be compared with MRI to assess for mass expansion.  Serial imaging may be indicated.\par \par Follow-up recommended upon completion of imaging

## 2022-10-25 NOTE — CONSULT LETTER
[Please see my note below.] : Please see my note below. [FreeTextEntry2] : Dear HUMERA FOX  [FreeTextEntry1] : Thank you for allowing me to participate in the care of CHARLEY CA .\par Please see the attached visit note.\par \par \par \par [place]\par Otology\par Medical Director of [facility]\par Department of Otolaryngology\par [facility]

## 2022-10-25 NOTE — DATA REVIEWED
[de-identified] : Audiometry from several months ago reviewed.  Hearing is within normal limits except for high-frequency hearing loss bilaterally\par VNG incomplete\par  [de-identified] : MRI result from June 2022 reviewed including images

## 2022-10-27 ENCOUNTER — APPOINTMENT (OUTPATIENT)
Dept: ORTHOPEDIC SURGERY | Facility: CLINIC | Age: 57
End: 2022-10-27

## 2022-10-28 NOTE — REVIEW OF SYSTEMS
[Memory Loss] : memory loss [Negative] : Gastrointestinal [Fever] : no fever [Chills] : no chills [Chest Pain] : no chest pain [Palpitations] : no palpitations [Shortness Of Breath] : no shortness of breath [Wheezing] : no wheezing [Abdominal Pain] : no abdominal pain

## 2022-10-28 NOTE — HISTORY OF PRESENT ILLNESS
[FreeTextEntry1] : Memory impairment [de-identified] : 56 y/o female [facility]. Pt had covid Jan 2022, was not hopitalized, received abx course and 5 day course of prednisone.\par 3 weeks afterwards pt had episode of shingles. now resolved. Pt has not had shingles vaccine yet.\par March 23rd pt had anaphylactic rxn to banana which she was previously fine eating. Pt has known allergy to latex diagnosed through allergy screening done after anaphylaxis episode.\par Since May, Pt has been experiencing shortterm memory impairment, brain fog and increased anxiety. Pt work as a  and describes significant impairment in performing work tasks.\par \par Pt t received J&J vaccine Dec 2020 x 2 and moderna booster x1 Dec 2021

## 2022-10-28 NOTE — END OF VISIT
[] : Resident [FreeTextEntry3] : Patient counseled about benefit of COVID vaccine and encouraged to receive COVID vaccination in the future.\par

## 2022-10-28 NOTE — ASSESSMENT
[FreeTextEntry1] : 59 y/o female presenting with memory impairment  post covid. \par \par #Post-covid syndrome\par - covid Jan 2022, s/p prednisone and abx\par - Neuropsych referral for neurocognitive assessment\par - Ferritin/CRP markers \par - RTC in 3 months

## 2022-11-18 ENCOUNTER — OUTPATIENT (OUTPATIENT)
Dept: OUTPATIENT SERVICES | Facility: HOSPITAL | Age: 57
LOS: 1 days | Discharge: HOME | End: 2022-11-18

## 2022-11-18 ENCOUNTER — APPOINTMENT (OUTPATIENT)
Dept: NEUROPSYCHOLOGY | Facility: CLINIC | Age: 57
End: 2022-11-18

## 2022-11-18 DIAGNOSIS — Z98.890 OTHER SPECIFIED POSTPROCEDURAL STATES: Chronic | ICD-10-CM

## 2022-11-18 DIAGNOSIS — G31.84 MILD COGNITIVE IMPAIRMENT OF UNCERTAIN OR UNKNOWN ETIOLOGY: ICD-10-CM

## 2022-12-02 ENCOUNTER — APPOINTMENT (OUTPATIENT)
Dept: INTERNAL MEDICINE | Facility: CLINIC | Age: 57
End: 2022-12-02

## 2023-02-03 ENCOUNTER — OUTPATIENT (OUTPATIENT)
Dept: OUTPATIENT SERVICES | Facility: HOSPITAL | Age: 58
LOS: 1 days | Discharge: HOME | End: 2023-02-03

## 2023-02-03 ENCOUNTER — APPOINTMENT (OUTPATIENT)
Dept: INTERNAL MEDICINE | Facility: CLINIC | Age: 58
End: 2023-02-03
Payer: COMMERCIAL

## 2023-02-03 ENCOUNTER — OUTPATIENT (OUTPATIENT)
Dept: OUTPATIENT SERVICES | Facility: HOSPITAL | Age: 58
LOS: 1 days | End: 2023-02-03

## 2023-02-03 ENCOUNTER — APPOINTMENT (OUTPATIENT)
Dept: NEUROPSYCHOLOGY | Facility: CLINIC | Age: 58
End: 2023-02-03

## 2023-02-03 ENCOUNTER — NON-APPOINTMENT (OUTPATIENT)
Age: 58
End: 2023-02-03

## 2023-02-03 DIAGNOSIS — Z98.890 OTHER SPECIFIED POSTPROCEDURAL STATES: Chronic | ICD-10-CM

## 2023-02-03 DIAGNOSIS — U09.9 POST COVID-19 CONDITION, UNSPECIFIED: ICD-10-CM

## 2023-02-03 DIAGNOSIS — U09.9 ANXIETY DISORDER, UNSPECIFIED: ICD-10-CM

## 2023-02-03 DIAGNOSIS — F41.9 ANXIETY DISORDER, UNSPECIFIED: ICD-10-CM

## 2023-02-03 DIAGNOSIS — G31.84 MILD COGNITIVE IMPAIRMENT OF UNCERTAIN OR UNKNOWN ETIOLOGY: ICD-10-CM

## 2023-02-03 PROCEDURE — 99213 OFFICE O/P EST LOW 20 MIN: CPT | Mod: GC

## 2023-02-03 NOTE — HISTORY OF PRESENT ILLNESS
[FreeTextEntry1] : f/u long covid [de-identified] : here for persistant c/o dofficulty identifying objects or verbalzing names. Still c/o insomnia but well controlled with trazadone\par otherwise, had recently moved to [place] recently

## 2023-02-03 NOTE — ASSESSMENT
[FreeTextEntry1] : 59 y/o female presenting with memory impairment post covid. \par \par #Post-covid syndrome\par - covid Jan 2022, s/p prednisone and abx\par - Neuropsych seen, being sent for full assesment\par - RTC in 3 months.

## 2023-02-03 NOTE — PHYSICAL EXAM
[No Acute Distress] : no acute distress [Normal Sclera/Conjunctiva] : normal sclera/conjunctiva [No JVD] : no jugular venous distention [No Respiratory Distress] : no respiratory distress  [Normal Rate] : normal rate  [Soft] : abdomen soft [No HSM] : no HSM [No Joint Swelling] : no joint swelling [No Rash] : no rash [Speech Grossly Normal] : speech grossly normal

## 2023-03-17 LAB
CRP SERPL-MCNC: <3 MG/L
FERRITIN SERPL-MCNC: 23 NG/ML

## 2023-07-28 ENCOUNTER — NON-APPOINTMENT (OUTPATIENT)
Age: 58
End: 2023-07-28

## 2023-08-17 ENCOUNTER — NON-APPOINTMENT (OUTPATIENT)
Age: 58
End: 2023-08-17

## 2023-09-26 ENCOUNTER — NON-APPOINTMENT (OUTPATIENT)
Age: 58
End: 2023-09-26

## 2024-04-26 ENCOUNTER — NON-APPOINTMENT (OUTPATIENT)
Age: 59
End: 2024-04-26

## 2025-07-14 ENCOUNTER — APPOINTMENT (OUTPATIENT)
Dept: URBAN - METROPOLITAN AREA CLINIC 166 | Facility: CLINIC | Age: 60
Setting detail: DERMATOLOGY
End: 2025-07-14

## 2025-07-14 DIAGNOSIS — D18.0 HEMANGIOMA: ICD-10-CM

## 2025-07-14 DIAGNOSIS — L57.0 ACTINIC KERATOSIS: ICD-10-CM

## 2025-07-14 DIAGNOSIS — L57.8 OTHER SKIN CHANGES DUE TO CHRONIC EXPOSURE TO NONIONIZING RADIATION: ICD-10-CM

## 2025-07-14 DIAGNOSIS — B07.8 OTHER VIRAL WARTS: ICD-10-CM

## 2025-07-14 DIAGNOSIS — L82.1 OTHER SEBORRHEIC KERATOSIS: ICD-10-CM

## 2025-07-14 DIAGNOSIS — Z71.89 OTHER SPECIFIED COUNSELING: ICD-10-CM

## 2025-07-14 DIAGNOSIS — D49.2 NEOPLASM OF UNSPECIFIED BEHAVIOR OF BONE, SOFT TISSUE, AND SKIN: ICD-10-CM

## 2025-07-14 DIAGNOSIS — D22 MELANOCYTIC NEVI: ICD-10-CM

## 2025-07-14 DIAGNOSIS — L81.4 OTHER MELANIN HYPERPIGMENTATION: ICD-10-CM

## 2025-07-14 DIAGNOSIS — L82.0 INFLAMED SEBORRHEIC KERATOSIS: ICD-10-CM

## 2025-07-14 PROBLEM — D22.5 MELANOCYTIC NEVI OF TRUNK: Status: ACTIVE | Noted: 2025-07-14

## 2025-07-14 PROBLEM — D18.01 HEMANGIOMA OF SKIN AND SUBCUTANEOUS TISSUE: Status: ACTIVE | Noted: 2025-07-14

## 2025-07-14 PROCEDURE — ? COUNSELING

## 2025-07-14 PROCEDURE — ?: Mod: 59

## 2025-07-14 PROCEDURE — ? LIQUID NITROGEN

## 2025-07-14 PROCEDURE — ? PUNCH EXCISION

## 2025-07-14 PROCEDURE — ?

## 2025-07-14 PROCEDURE — ?: Mod: 25

## 2025-07-14 ASSESSMENT — LOCATION ZONE DERM
LOCATION ZONE: HAND
LOCATION ZONE: NOSE
LOCATION ZONE: FACE
LOCATION ZONE: TRUNK

## 2025-07-14 ASSESSMENT — LOCATION DETAILED DESCRIPTION DERM
LOCATION DETAILED: RIGHT ULNAR DORSAL HAND
LOCATION DETAILED: LEFT SUPERIOR MEDIAL UPPER BACK
LOCATION DETAILED: LEFT MEDIAL MALAR CHEEK
LOCATION DETAILED: LEFT SUPERIOR UPPER BACK
LOCATION DETAILED: NASAL DORSUM
LOCATION DETAILED: RIGHT RADIAL DORSAL HAND

## 2025-07-14 ASSESSMENT — LOCATION SIMPLE DESCRIPTION DERM
LOCATION SIMPLE: NOSE
LOCATION SIMPLE: RIGHT HAND
LOCATION SIMPLE: LEFT CHEEK
LOCATION SIMPLE: LEFT UPPER BACK

## 2025-07-14 NOTE — PROCEDURE: MIPS QUALITY
Detail Level: Zone
Quality 226: Preventive Care And Screening: Tobacco Use: Screening And Cessation Intervention: Patient screened for tobacco use and is an ex/non-smoker
Quality 508: Adult Covid-19 Vaccination Status: Patients who are not up to date on their COVID-19 vaccinations as defined by CDC recommendations on current vaccination

## 2025-07-14 NOTE — PROCEDURE: COUNSELING
Detail Level: Detailed
Detail Level: Generalized
Skin Checks Recommendations: Should be performed monthly.
Detail Level: Zone
Sunscreen Recommendations: Examples include: Neutrogena, CereVe, Elta MD, Yariel Prado LaRoche Possay.  Mineral based sunscreens are best.
Detail Level: Simple

## 2025-07-14 NOTE — PROCEDURE: PUNCH EXCISION

## 2025-07-14 NOTE — PROCEDURE: LIQUID NITROGEN
Spray Paint Technique: No
Medical Necessity Information: It is in your best interest to select a reason for this procedure from the list below. All of these items fulfill various CMS LCD requirements except the new and changing color options.
Detail Level: Detailed
Consent: The patient's consent was obtained including but not limited to risks of crusting, scabbing, blistering, scarring, darker or lighter pigmentary change, recurrence, incomplete removal and infection.
Show Topical Anesthesia Variable?: Yes
Post-Care Instructions: I reviewed with the patient in detail post-care instructions. Patient is to avoid picking at any of the treated lesions. Pt may apply Vaseline, Aquaphor, or CeraVe Healing Ointment to crusted or scabbing areas.
Medical Necessity Clause: This procedure was medically necessary because the lesions that were treated were:
Spray Paint Text: The liquid nitrogen was applied to the skin utilizing a spray paint frosting technique.
Number Of Freeze-Thaw Cycles: 2 freeze-thaw cycles
Duration Of Freeze Thaw-Cycle (Seconds): 0
Post-Care Instructions: I reviewed with the patient in detail post-care instructions. Patient is to wear sunprotection, and avoid picking at any of the treated lesions. Pt may apply Vaseline to crusted or scabbing areas.

## 2025-07-14 NOTE — HPI: FULL BODY SKIN EXAMINATION
What Is The Reason For Today's Visit?: Initial Full Body Skin Examination
What Is The Reason For Today's Visit? (Being Monitored For X): the development of new lesions
Additional History: -Patient has dry itchy skin and would like to talk about a dry scaly patch on bridge of nose.

## 2025-07-24 ENCOUNTER — APPOINTMENT (OUTPATIENT)
Dept: URBAN - METROPOLITAN AREA CLINIC 166 | Facility: CLINIC | Age: 60
Setting detail: DERMATOLOGY
End: 2025-07-24

## 2025-07-24 DIAGNOSIS — Z48.02 ENCOUNTER FOR REMOVAL OF SUTURES: ICD-10-CM

## 2025-07-24 PROCEDURE — ? POST-OP WOUND EVALUATION

## 2025-07-24 ASSESSMENT — LOCATION DETAILED DESCRIPTION DERM: LOCATION DETAILED: LEFT SUPERIOR UPPER BACK

## 2025-07-24 ASSESSMENT — LOCATION SIMPLE DESCRIPTION DERM: LOCATION SIMPLE: LEFT UPPER BACK

## 2025-07-24 ASSESSMENT — LOCATION ZONE DERM: LOCATION ZONE: TRUNK

## 2025-07-24 NOTE — PROCEDURE: POST-OP WOUND EVALUATION
Detail Level: Simple
Add 20039 Cpt? (Important Note: In 2017 The Use Of 66800 Is Being Tracked By Cms To Determine Future Global Period Reimbursement For Global Periods): no
Wound Evaluated By (Optional): Flora Fierro
Wound Diameter In Cm(Optional): 0
Wound Crusting?: clean
Sutures?: intact
Wound Color?: pink